# Patient Record
Sex: FEMALE | Race: WHITE | URBAN - METROPOLITAN AREA
[De-identification: names, ages, dates, MRNs, and addresses within clinical notes are randomized per-mention and may not be internally consistent; named-entity substitution may affect disease eponyms.]

---

## 2018-01-03 ENCOUNTER — HOSPITAL ENCOUNTER (OUTPATIENT)
Dept: LAB | Age: 53
Discharge: HOME | End: 2018-01-03

## 2018-01-03 LAB — SPECIMEN SENT TO LAB,INSX: NORMAL

## 2022-07-21 ENCOUNTER — APPOINTMENT (OUTPATIENT)
Dept: GENERAL RADIOLOGY | Age: 57
DRG: 482 | End: 2022-07-21
Attending: EMERGENCY MEDICINE
Payer: COMMERCIAL

## 2022-07-21 PROCEDURE — 85025 COMPLETE CBC W/AUTO DIFF WBC: CPT

## 2022-07-21 PROCEDURE — 80053 COMPREHEN METABOLIC PANEL: CPT

## 2022-07-21 PROCEDURE — 94760 N-INVAS EAR/PLS OXIMETRY 1: CPT

## 2022-07-21 PROCEDURE — 99285 EMERGENCY DEPT VISIT HI MDM: CPT

## 2022-07-21 PROCEDURE — 73502 X-RAY EXAM HIP UNI 2-3 VIEWS: CPT

## 2022-07-22 ENCOUNTER — APPOINTMENT (OUTPATIENT)
Dept: GENERAL RADIOLOGY | Age: 57
DRG: 482 | End: 2022-07-22
Attending: ORTHOPAEDIC SURGERY
Payer: COMMERCIAL

## 2022-07-22 ENCOUNTER — ANESTHESIA EVENT (OUTPATIENT)
Dept: SURGERY | Age: 57
DRG: 482 | End: 2022-07-22
Payer: COMMERCIAL

## 2022-07-22 ENCOUNTER — HOSPITAL ENCOUNTER (INPATIENT)
Age: 57
LOS: 4 days | Discharge: NURSING, REHAB OR LONG-TERM CARE | DRG: 482 | End: 2022-07-26
Attending: EMERGENCY MEDICINE | Admitting: FAMILY MEDICINE
Payer: COMMERCIAL

## 2022-07-22 ENCOUNTER — HOME HEALTH ADMISSION (OUTPATIENT)
Dept: HOME HEALTH SERVICES | Facility: HOME HEALTH | Age: 57
End: 2022-07-22

## 2022-07-22 ENCOUNTER — ANESTHESIA (OUTPATIENT)
Dept: SURGERY | Age: 57
DRG: 482 | End: 2022-07-22
Payer: COMMERCIAL

## 2022-07-22 ENCOUNTER — APPOINTMENT (OUTPATIENT)
Dept: GENERAL RADIOLOGY | Age: 57
DRG: 482 | End: 2022-07-22
Attending: EMERGENCY MEDICINE
Payer: COMMERCIAL

## 2022-07-22 DIAGNOSIS — S72.002A CLOSED FRACTURE OF NECK OF LEFT FEMUR, INITIAL ENCOUNTER (HCC): Primary | ICD-10-CM

## 2022-07-22 PROBLEM — G35 MULTIPLE SCLEROSIS (HCC): Status: ACTIVE | Noted: 2022-07-22
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
## 2022-07-22 PROBLEM — M25.552 ACUTE HIP PAIN, LEFT: Status: ACTIVE | Noted: 2022-07-22

## 2022-07-22 LAB
ABO + RH BLD: NORMAL
ALBUMIN SERPL-MCNC: 3.9 G/DL (ref 3.5–5)
ALBUMIN/GLOB SERPL: 1.2 {RATIO} (ref 1.1–2.2)
ALP SERPL-CCNC: 64 U/L (ref 45–117)
ALT SERPL-CCNC: 22 U/L (ref 12–78)
ANION GAP SERPL CALC-SCNC: 4 MMOL/L (ref 5–15)
APTT PPP: 27.2 SEC (ref 22.1–31)
AST SERPL-CCNC: 13 U/L (ref 15–37)
ATRIAL RATE: 80 BPM
BASOPHILS # BLD: 0 K/UL (ref 0–0.1)
BASOPHILS NFR BLD: 0 % (ref 0–1)
BILIRUB SERPL-MCNC: 0.2 MG/DL (ref 0.2–1)
BLOOD GROUP ANTIBODIES SERPL: NORMAL
BUN SERPL-MCNC: 14 MG/DL (ref 6–20)
BUN/CREAT SERPL: 19 (ref 12–20)
CALCIUM SERPL-MCNC: 9.3 MG/DL (ref 8.5–10.1)
CALCULATED P AXIS, ECG09: 71 DEGREES
CALCULATED R AXIS, ECG10: 54 DEGREES
CALCULATED T AXIS, ECG11: 37 DEGREES
CHLORIDE SERPL-SCNC: 107 MMOL/L (ref 97–108)
CO2 SERPL-SCNC: 30 MMOL/L (ref 21–32)
COMMENT, HOLDF: NORMAL
COVID-19 RAPID TEST, COVR: NOT DETECTED
CREAT SERPL-MCNC: 0.72 MG/DL (ref 0.55–1.02)
DIAGNOSIS, 93000: NORMAL
DIFFERENTIAL METHOD BLD: ABNORMAL
EOSINOPHIL # BLD: 0.1 K/UL (ref 0–0.4)
EOSINOPHIL NFR BLD: 1 % (ref 0–7)
ERYTHROCYTE [DISTWIDTH] IN BLOOD BY AUTOMATED COUNT: 13.3 % (ref 11.5–14.5)
GLOBULIN SER CALC-MCNC: 3.3 G/DL (ref 2–4)
GLUCOSE SERPL-MCNC: 99 MG/DL (ref 65–100)
HCT VFR BLD AUTO: 37.7 % (ref 35–47)
HGB BLD-MCNC: 12.6 G/DL (ref 11.5–16)
IMM GRANULOCYTES # BLD AUTO: 0 K/UL (ref 0–0.04)
IMM GRANULOCYTES NFR BLD AUTO: 1 % (ref 0–0.5)
INR PPP: 1 (ref 0.9–1.1)
LYMPHOCYTES # BLD: 1.4 K/UL (ref 0.8–3.5)
LYMPHOCYTES NFR BLD: 18 % (ref 12–49)
MCH RBC QN AUTO: 31.3 PG (ref 26–34)
MCHC RBC AUTO-ENTMCNC: 33.4 G/DL (ref 30–36.5)
MCV RBC AUTO: 93.5 FL (ref 80–99)
MONOCYTES # BLD: 0.5 K/UL (ref 0–1)
MONOCYTES NFR BLD: 7 % (ref 5–13)
NEUTS SEG # BLD: 5.6 K/UL (ref 1.8–8)
NEUTS SEG NFR BLD: 73 % (ref 32–75)
NRBC # BLD: 0 K/UL (ref 0–0.01)
NRBC BLD-RTO: 0 PER 100 WBC
P-R INTERVAL, ECG05: 116 MS
PLATELET # BLD AUTO: 234 K/UL (ref 150–400)
PMV BLD AUTO: 10.4 FL (ref 8.9–12.9)
POTASSIUM SERPL-SCNC: 3.9 MMOL/L (ref 3.5–5.1)
PROT SERPL-MCNC: 7.2 G/DL (ref 6.4–8.2)
PROTHROMBIN TIME: 10.8 SEC (ref 9–11.1)
Q-T INTERVAL, ECG07: 360 MS
QRS DURATION, ECG06: 82 MS
QTC CALCULATION (BEZET), ECG08: 415 MS
RBC # BLD AUTO: 4.03 M/UL (ref 3.8–5.2)
SAMPLES BEING HELD,HOLD: NORMAL
SODIUM SERPL-SCNC: 141 MMOL/L (ref 136–145)
SOURCE, COVRS: NORMAL
SPECIMEN EXP DATE BLD: NORMAL
THERAPEUTIC RANGE,PTTT: NORMAL SECS (ref 58–77)
VENTRICULAR RATE, ECG03: 80 BPM
WBC # BLD AUTO: 7.7 K/UL (ref 3.6–11)

## 2022-07-22 PROCEDURE — 0QH734Z INSERTION OF INTERNAL FIXATION DEVICE INTO LEFT UPPER FEMUR, PERCUTANEOUS APPROACH: ICD-10-PCS | Performed by: ORTHOPAEDIC SURGERY

## 2022-07-22 PROCEDURE — 77030008684 HC TU ET CUF COVD -B: Performed by: ANESTHESIOLOGY

## 2022-07-22 PROCEDURE — 74011000250 HC RX REV CODE- 250: Performed by: NURSE ANESTHETIST, CERTIFIED REGISTERED

## 2022-07-22 PROCEDURE — 93005 ELECTROCARDIOGRAM TRACING: CPT

## 2022-07-22 PROCEDURE — 74011250636 HC RX REV CODE- 250/636: Performed by: PHYSICIAN ASSISTANT

## 2022-07-22 PROCEDURE — 74011250636 HC RX REV CODE- 250/636: Performed by: NURSE ANESTHETIST, CERTIFIED REGISTERED

## 2022-07-22 PROCEDURE — 2709999900 HC NON-CHARGEABLE SUPPLY: Performed by: ORTHOPAEDIC SURGERY

## 2022-07-22 PROCEDURE — 74011250636 HC RX REV CODE- 250/636: Performed by: ANESTHESIOLOGY

## 2022-07-22 PROCEDURE — 99222 1ST HOSP IP/OBS MODERATE 55: CPT | Performed by: ORTHOPAEDIC SURGERY

## 2022-07-22 PROCEDURE — 65270000029 HC RM PRIVATE

## 2022-07-22 PROCEDURE — 77030041279 HC DRSG PRMSL AG MDII -B: Performed by: ORTHOPAEDIC SURGERY

## 2022-07-22 PROCEDURE — 76210000016 HC OR PH I REC 1 TO 1.5 HR: Performed by: ORTHOPAEDIC SURGERY

## 2022-07-22 PROCEDURE — 77030002933 HC SUT MCRYL J&J -A: Performed by: ORTHOPAEDIC SURGERY

## 2022-07-22 PROCEDURE — 27235 TREAT THIGH FRACTURE: CPT | Performed by: ORTHOPAEDIC SURGERY

## 2022-07-22 PROCEDURE — 77030020788: Performed by: ORTHOPAEDIC SURGERY

## 2022-07-22 PROCEDURE — 77030011264 HC ELECTRD BLD EXT COVD -A: Performed by: ORTHOPAEDIC SURGERY

## 2022-07-22 PROCEDURE — 76060000034 HC ANESTHESIA 1.5 TO 2 HR: Performed by: ORTHOPAEDIC SURGERY

## 2022-07-22 PROCEDURE — 73501 X-RAY EXAM HIP UNI 1 VIEW: CPT

## 2022-07-22 PROCEDURE — 85610 PROTHROMBIN TIME: CPT

## 2022-07-22 PROCEDURE — 74011250637 HC RX REV CODE- 250/637: Performed by: ANESTHESIOLOGY

## 2022-07-22 PROCEDURE — C1769 GUIDE WIRE: HCPCS | Performed by: ORTHOPAEDIC SURGERY

## 2022-07-22 PROCEDURE — 36415 COLL VENOUS BLD VENIPUNCTURE: CPT

## 2022-07-22 PROCEDURE — 77030006802 HC BLD SAW RECIP BRSM -B: Performed by: ORTHOPAEDIC SURGERY

## 2022-07-22 PROCEDURE — 74011250637 HC RX REV CODE- 250/637: Performed by: NURSE PRACTITIONER

## 2022-07-22 PROCEDURE — 71045 X-RAY EXAM CHEST 1 VIEW: CPT

## 2022-07-22 PROCEDURE — 86900 BLOOD TYPING SEROLOGIC ABO: CPT

## 2022-07-22 PROCEDURE — 74011250637 HC RX REV CODE- 250/637: Performed by: PHYSICIAN ASSISTANT

## 2022-07-22 PROCEDURE — 74011000250 HC RX REV CODE- 250: Performed by: ORTHOPAEDIC SURGERY

## 2022-07-22 PROCEDURE — 74011000250 HC RX REV CODE- 250: Performed by: PHYSICIAN ASSISTANT

## 2022-07-22 PROCEDURE — 77030035236 HC SUT PDS STRATFX BARB J&J -B: Performed by: ORTHOPAEDIC SURGERY

## 2022-07-22 PROCEDURE — C1713 ANCHOR/SCREW BN/BN,TIS/BN: HCPCS | Performed by: ORTHOPAEDIC SURGERY

## 2022-07-22 PROCEDURE — 74011000250 HC RX REV CODE- 250: Performed by: FAMILY MEDICINE

## 2022-07-22 PROCEDURE — 85730 THROMBOPLASTIN TIME PARTIAL: CPT

## 2022-07-22 PROCEDURE — 77030006822 HC BLD SAW SAG BRSM -B: Performed by: ORTHOPAEDIC SURGERY

## 2022-07-22 PROCEDURE — 77030018547 HC SUT ETHBND1 J&J -B: Performed by: ORTHOPAEDIC SURGERY

## 2022-07-22 PROCEDURE — 73552 X-RAY EXAM OF FEMUR 2/>: CPT

## 2022-07-22 PROCEDURE — 76010000161 HC OR TIME 1 TO 1.5 HR INTENSV-TIER 1: Performed by: ORTHOPAEDIC SURGERY

## 2022-07-22 PROCEDURE — 77030031139 HC SUT VCRL2 J&J -A: Performed by: ORTHOPAEDIC SURGERY

## 2022-07-22 PROCEDURE — 87635 SARS-COV-2 COVID-19 AMP PRB: CPT

## 2022-07-22 PROCEDURE — 77030026438 HC STYL ET INTUB CARD -A: Performed by: ANESTHESIOLOGY

## 2022-07-22 PROCEDURE — 94761 N-INVAS EAR/PLS OXIMETRY MLT: CPT

## 2022-07-22 PROCEDURE — 77030010507 HC ADH SKN DERMBND J&J -B: Performed by: ORTHOPAEDIC SURGERY

## 2022-07-22 DEVICE — SCREW BNE L80MM DIA6.5MM THRD L16MM CANC S STL SELF DRL ST: Type: IMPLANTABLE DEVICE | Site: HIP | Status: FUNCTIONAL

## 2022-07-22 RX ORDER — SODIUM CHLORIDE, SODIUM LACTATE, POTASSIUM CHLORIDE, CALCIUM CHLORIDE 600; 310; 30; 20 MG/100ML; MG/100ML; MG/100ML; MG/100ML
1000 INJECTION, SOLUTION INTRAVENOUS CONTINUOUS
Status: DISCONTINUED | OUTPATIENT
Start: 2022-07-22 | End: 2022-07-23

## 2022-07-22 RX ORDER — FENTANYL CITRATE 50 UG/ML
50 INJECTION, SOLUTION INTRAMUSCULAR; INTRAVENOUS AS NEEDED
Status: DISCONTINUED | OUTPATIENT
Start: 2022-07-22 | End: 2022-07-23

## 2022-07-22 RX ORDER — PHENYLEPHRINE HCL IN 0.9% NACL 0.4MG/10ML
SYRINGE (ML) INTRAVENOUS AS NEEDED
Status: DISCONTINUED | OUTPATIENT
Start: 2022-07-22 | End: 2022-07-22 | Stop reason: HOSPADM

## 2022-07-22 RX ORDER — FENTANYL CITRATE 50 UG/ML
INJECTION, SOLUTION INTRAMUSCULAR; INTRAVENOUS AS NEEDED
Status: DISCONTINUED | OUTPATIENT
Start: 2022-07-22 | End: 2022-07-22 | Stop reason: HOSPADM

## 2022-07-22 RX ORDER — SODIUM CHLORIDE, SODIUM LACTATE, POTASSIUM CHLORIDE, CALCIUM CHLORIDE 600; 310; 30; 20 MG/100ML; MG/100ML; MG/100ML; MG/100ML
1000 INJECTION, SOLUTION INTRAVENOUS CONTINUOUS
Status: DISCONTINUED | OUTPATIENT
Start: 2022-07-22 | End: 2022-07-22 | Stop reason: HOSPADM

## 2022-07-22 RX ORDER — POLYETHYLENE GLYCOL 3350 17 G/17G
17 POWDER, FOR SOLUTION ORAL DAILY
Status: DISCONTINUED | OUTPATIENT
Start: 2022-07-23 | End: 2022-07-26 | Stop reason: HOSPADM

## 2022-07-22 RX ORDER — ONDANSETRON 2 MG/ML
INJECTION INTRAMUSCULAR; INTRAVENOUS AS NEEDED
Status: DISCONTINUED | OUTPATIENT
Start: 2022-07-22 | End: 2022-07-22 | Stop reason: HOSPADM

## 2022-07-22 RX ORDER — FAMOTIDINE 20 MG/1
20 TABLET, FILM COATED ORAL 2 TIMES DAILY
Status: DISCONTINUED | OUTPATIENT
Start: 2022-07-22 | End: 2022-07-26 | Stop reason: HOSPADM

## 2022-07-22 RX ORDER — AMOXICILLIN 250 MG
1 CAPSULE ORAL 2 TIMES DAILY
Status: DISCONTINUED | OUTPATIENT
Start: 2022-07-22 | End: 2022-07-26 | Stop reason: HOSPADM

## 2022-07-22 RX ORDER — ACETAMINOPHEN 650 MG/1
650 SUPPOSITORY RECTAL
Status: DISCONTINUED | OUTPATIENT
Start: 2022-07-22 | End: 2022-07-26 | Stop reason: HOSPADM

## 2022-07-22 RX ORDER — DEXAMETHASONE SODIUM PHOSPHATE 4 MG/ML
INJECTION, SOLUTION INTRA-ARTICULAR; INTRALESIONAL; INTRAMUSCULAR; INTRAVENOUS; SOFT TISSUE AS NEEDED
Status: DISCONTINUED | OUTPATIENT
Start: 2022-07-22 | End: 2022-07-22 | Stop reason: HOSPADM

## 2022-07-22 RX ORDER — FACIAL-BODY WIPES
10 EACH TOPICAL DAILY PRN
Status: DISCONTINUED | OUTPATIENT
Start: 2022-07-24 | End: 2022-07-26 | Stop reason: HOSPADM

## 2022-07-22 RX ORDER — HYDROCODONE BITARTRATE AND ACETAMINOPHEN 5; 325 MG/1; MG/1
1 TABLET ORAL AS NEEDED
Status: DISCONTINUED | OUTPATIENT
Start: 2022-07-22 | End: 2022-07-22 | Stop reason: HOSPADM

## 2022-07-22 RX ORDER — HYDROXYZINE HYDROCHLORIDE 10 MG/1
10 TABLET, FILM COATED ORAL
Status: DISCONTINUED | OUTPATIENT
Start: 2022-07-22 | End: 2022-07-26 | Stop reason: HOSPADM

## 2022-07-22 RX ORDER — LIDOCAINE 4 G/100G
1 PATCH TOPICAL EVERY 24 HOURS
Status: DISCONTINUED | OUTPATIENT
Start: 2022-07-22 | End: 2022-07-26 | Stop reason: HOSPADM

## 2022-07-22 RX ORDER — GLYCOPYRROLATE 0.2 MG/ML
0.2 INJECTION INTRAMUSCULAR; INTRAVENOUS
Status: DISCONTINUED | OUTPATIENT
Start: 2022-07-22 | End: 2022-07-23

## 2022-07-22 RX ORDER — OXYCODONE HYDROCHLORIDE 5 MG/1
5 TABLET ORAL
Status: DISCONTINUED | OUTPATIENT
Start: 2022-07-22 | End: 2022-07-26 | Stop reason: HOSPADM

## 2022-07-22 RX ORDER — MIDAZOLAM HYDROCHLORIDE 1 MG/ML
INJECTION, SOLUTION INTRAMUSCULAR; INTRAVENOUS AS NEEDED
Status: DISCONTINUED | OUTPATIENT
Start: 2022-07-22 | End: 2022-07-22 | Stop reason: HOSPADM

## 2022-07-22 RX ORDER — LIDOCAINE HYDROCHLORIDE 10 MG/ML
0.1 INJECTION, SOLUTION EPIDURAL; INFILTRATION; INTRACAUDAL; PERINEURAL AS NEEDED
Status: DISCONTINUED | OUTPATIENT
Start: 2022-07-22 | End: 2022-07-23

## 2022-07-22 RX ORDER — TRAMADOL HYDROCHLORIDE 50 MG/1
50 TABLET ORAL
Status: DISCONTINUED | OUTPATIENT
Start: 2022-07-22 | End: 2022-07-26 | Stop reason: HOSPADM

## 2022-07-22 RX ORDER — MORPHINE SULFATE 2 MG/ML
2 INJECTION, SOLUTION INTRAMUSCULAR; INTRAVENOUS
Status: DISCONTINUED | OUTPATIENT
Start: 2022-07-22 | End: 2022-07-22 | Stop reason: HOSPADM

## 2022-07-22 RX ORDER — ONDANSETRON 2 MG/ML
4 INJECTION INTRAMUSCULAR; INTRAVENOUS
Status: DISCONTINUED | OUTPATIENT
Start: 2022-07-22 | End: 2022-07-26 | Stop reason: HOSPADM

## 2022-07-22 RX ORDER — ONDANSETRON 4 MG/1
4 TABLET, ORALLY DISINTEGRATING ORAL
Status: DISCONTINUED | OUTPATIENT
Start: 2022-07-22 | End: 2022-07-26 | Stop reason: HOSPADM

## 2022-07-22 RX ORDER — HYDROMORPHONE HYDROCHLORIDE 1 MG/ML
0.5 INJECTION, SOLUTION INTRAMUSCULAR; INTRAVENOUS; SUBCUTANEOUS
Status: DISCONTINUED | OUTPATIENT
Start: 2022-07-22 | End: 2022-07-26 | Stop reason: HOSPADM

## 2022-07-22 RX ORDER — ROCURONIUM BROMIDE 10 MG/ML
INJECTION, SOLUTION INTRAVENOUS AS NEEDED
Status: DISCONTINUED | OUTPATIENT
Start: 2022-07-22 | End: 2022-07-22 | Stop reason: HOSPADM

## 2022-07-22 RX ORDER — DEXMEDETOMIDINE HYDROCHLORIDE 100 UG/ML
INJECTION, SOLUTION INTRAVENOUS AS NEEDED
Status: DISCONTINUED | OUTPATIENT
Start: 2022-07-22 | End: 2022-07-22 | Stop reason: HOSPADM

## 2022-07-22 RX ORDER — POLYETHYLENE GLYCOL 3350 17 G/17G
17 POWDER, FOR SOLUTION ORAL DAILY PRN
Status: DISCONTINUED | OUTPATIENT
Start: 2022-07-22 | End: 2022-07-26 | Stop reason: HOSPADM

## 2022-07-22 RX ORDER — SODIUM CHLORIDE 9 MG/ML
25 INJECTION, SOLUTION INTRAVENOUS CONTINUOUS
Status: DISCONTINUED | OUTPATIENT
Start: 2022-07-22 | End: 2022-07-22 | Stop reason: HOSPADM

## 2022-07-22 RX ORDER — ASPIRIN 81 MG/1
81 TABLET ORAL 2 TIMES DAILY
Status: DISCONTINUED | OUTPATIENT
Start: 2022-07-22 | End: 2022-07-26 | Stop reason: HOSPADM

## 2022-07-22 RX ORDER — BACLOFEN 10 MG/1
10 TABLET ORAL 4 TIMES DAILY
COMMUNITY

## 2022-07-22 RX ORDER — SODIUM CHLORIDE 0.9 % (FLUSH) 0.9 %
5-40 SYRINGE (ML) INJECTION EVERY 8 HOURS
Status: DISCONTINUED | OUTPATIENT
Start: 2022-07-22 | End: 2022-07-26 | Stop reason: HOSPADM

## 2022-07-22 RX ORDER — ONDANSETRON 2 MG/ML
4 INJECTION INTRAMUSCULAR; INTRAVENOUS AS NEEDED
Status: DISCONTINUED | OUTPATIENT
Start: 2022-07-22 | End: 2022-07-22 | Stop reason: HOSPADM

## 2022-07-22 RX ORDER — SODIUM CHLORIDE 9 MG/ML
25 INJECTION, SOLUTION INTRAVENOUS CONTINUOUS
Status: DISCONTINUED | OUTPATIENT
Start: 2022-07-22 | End: 2022-07-23

## 2022-07-22 RX ORDER — HYDROMORPHONE HYDROCHLORIDE 1 MG/ML
0.2 INJECTION, SOLUTION INTRAMUSCULAR; INTRAVENOUS; SUBCUTANEOUS
Status: DISCONTINUED | OUTPATIENT
Start: 2022-07-22 | End: 2022-07-22 | Stop reason: HOSPADM

## 2022-07-22 RX ORDER — ROPIVACAINE HYDROCHLORIDE 5 MG/ML
30 INJECTION, SOLUTION EPIDURAL; INFILTRATION; PERINEURAL AS NEEDED
Status: DISCONTINUED | OUTPATIENT
Start: 2022-07-22 | End: 2022-07-23

## 2022-07-22 RX ORDER — FLUOXETINE 10 MG/1
10 CAPSULE ORAL DAILY
Status: DISCONTINUED | OUTPATIENT
Start: 2022-07-22 | End: 2022-07-26 | Stop reason: HOSPADM

## 2022-07-22 RX ORDER — ACETAMINOPHEN 325 MG/1
650 TABLET ORAL ONCE
Status: COMPLETED | OUTPATIENT
Start: 2022-07-22 | End: 2022-07-22

## 2022-07-22 RX ORDER — PROPOFOL 10 MG/ML
INJECTION, EMULSION INTRAVENOUS AS NEEDED
Status: DISCONTINUED | OUTPATIENT
Start: 2022-07-22 | End: 2022-07-22 | Stop reason: HOSPADM

## 2022-07-22 RX ORDER — FLUOXETINE 10 MG/1
10 CAPSULE ORAL DAILY
COMMUNITY

## 2022-07-22 RX ORDER — FENTANYL CITRATE 50 UG/ML
25 INJECTION, SOLUTION INTRAMUSCULAR; INTRAVENOUS
Status: DISCONTINUED | OUTPATIENT
Start: 2022-07-22 | End: 2022-07-22 | Stop reason: HOSPADM

## 2022-07-22 RX ORDER — SODIUM CHLORIDE 0.9 % (FLUSH) 0.9 %
5-40 SYRINGE (ML) INJECTION AS NEEDED
Status: DISCONTINUED | OUTPATIENT
Start: 2022-07-22 | End: 2022-07-26 | Stop reason: HOSPADM

## 2022-07-22 RX ORDER — DIPHENHYDRAMINE HYDROCHLORIDE 50 MG/ML
12.5 INJECTION, SOLUTION INTRAMUSCULAR; INTRAVENOUS AS NEEDED
Status: DISCONTINUED | OUTPATIENT
Start: 2022-07-22 | End: 2022-07-22 | Stop reason: HOSPADM

## 2022-07-22 RX ORDER — HYDROMORPHONE HYDROCHLORIDE 2 MG/ML
INJECTION, SOLUTION INTRAMUSCULAR; INTRAVENOUS; SUBCUTANEOUS AS NEEDED
Status: DISCONTINUED | OUTPATIENT
Start: 2022-07-22 | End: 2022-07-22 | Stop reason: HOSPADM

## 2022-07-22 RX ORDER — MIDAZOLAM HYDROCHLORIDE 1 MG/ML
1 INJECTION, SOLUTION INTRAMUSCULAR; INTRAVENOUS AS NEEDED
Status: DISCONTINUED | OUTPATIENT
Start: 2022-07-22 | End: 2022-07-23

## 2022-07-22 RX ORDER — LIDOCAINE HYDROCHLORIDE 20 MG/ML
INJECTION, SOLUTION EPIDURAL; INFILTRATION; INTRACAUDAL; PERINEURAL AS NEEDED
Status: DISCONTINUED | OUTPATIENT
Start: 2022-07-22 | End: 2022-07-22 | Stop reason: HOSPADM

## 2022-07-22 RX ORDER — BACLOFEN 10 MG/1
10 TABLET ORAL 4 TIMES DAILY
Status: DISCONTINUED | OUTPATIENT
Start: 2022-07-22 | End: 2022-07-26 | Stop reason: HOSPADM

## 2022-07-22 RX ORDER — ACETAMINOPHEN 325 MG/1
650 TABLET ORAL
Status: DISCONTINUED | OUTPATIENT
Start: 2022-07-22 | End: 2022-07-26 | Stop reason: HOSPADM

## 2022-07-22 RX ORDER — MIDAZOLAM HYDROCHLORIDE 1 MG/ML
0.5 INJECTION, SOLUTION INTRAMUSCULAR; INTRAVENOUS
Status: DISCONTINUED | OUTPATIENT
Start: 2022-07-22 | End: 2022-07-22 | Stop reason: HOSPADM

## 2022-07-22 RX ORDER — ALBUTEROL SULFATE 0.83 MG/ML
2.5 SOLUTION RESPIRATORY (INHALATION) AS NEEDED
Status: DISCONTINUED | OUTPATIENT
Start: 2022-07-22 | End: 2022-07-22 | Stop reason: HOSPADM

## 2022-07-22 RX ORDER — SUCCINYLCHOLINE CHLORIDE 20 MG/ML
INJECTION INTRAMUSCULAR; INTRAVENOUS AS NEEDED
Status: DISCONTINUED | OUTPATIENT
Start: 2022-07-22 | End: 2022-07-22 | Stop reason: HOSPADM

## 2022-07-22 RX ORDER — MORPHINE SULFATE 2 MG/ML
1 INJECTION, SOLUTION INTRAMUSCULAR; INTRAVENOUS
Status: DISCONTINUED | OUTPATIENT
Start: 2022-07-22 | End: 2022-07-24

## 2022-07-22 RX ORDER — BUPIVACAINE HYDROCHLORIDE AND EPINEPHRINE 5; 5 MG/ML; UG/ML
INJECTION, SOLUTION EPIDURAL; INTRACAUDAL; PERINEURAL AS NEEDED
Status: DISCONTINUED | OUTPATIENT
Start: 2022-07-22 | End: 2022-07-22 | Stop reason: HOSPADM

## 2022-07-22 RX ADMIN — PROPOFOL 120 MG: 10 INJECTION, EMULSION INTRAVENOUS at 15:24

## 2022-07-22 RX ADMIN — ROCURONIUM BROMIDE 20 MG: 10 SOLUTION INTRAVENOUS at 15:35

## 2022-07-22 RX ADMIN — DEXMEDETOMIDINE HYDROCHLORIDE 10 MCG: 100 INJECTION, SOLUTION, CONCENTRATE INTRAVENOUS at 15:19

## 2022-07-22 RX ADMIN — SODIUM CHLORIDE, PRESERVATIVE FREE 10 ML: 5 INJECTION INTRAVENOUS at 07:00

## 2022-07-22 RX ADMIN — CEFAZOLIN 2 G: 1 INJECTION, POWDER, FOR SOLUTION INTRAMUSCULAR; INTRAVENOUS at 23:55

## 2022-07-22 RX ADMIN — FENTANYL CITRATE 50 MCG: 50 INJECTION, SOLUTION INTRAMUSCULAR; INTRAVENOUS at 15:24

## 2022-07-22 RX ADMIN — WATER 2 G: 1 INJECTION INTRAMUSCULAR; INTRAVENOUS; SUBCUTANEOUS at 15:40

## 2022-07-22 RX ADMIN — ROCURONIUM BROMIDE 10 MG: 10 SOLUTION INTRAVENOUS at 15:24

## 2022-07-22 RX ADMIN — DOCUSATE SODIUM 50MG AND SENNOSIDES 8.6MG 1 TABLET: 8.6; 5 TABLET, FILM COATED ORAL at 19:14

## 2022-07-22 RX ADMIN — BACLOFEN 10 MG: 10 TABLET ORAL at 10:31

## 2022-07-22 RX ADMIN — MIDAZOLAM 2 MG: 1 INJECTION INTRAMUSCULAR; INTRAVENOUS at 15:13

## 2022-07-22 RX ADMIN — Medication 80 MG: at 15:25

## 2022-07-22 RX ADMIN — FAMOTIDINE 20 MG: 20 TABLET ORAL at 19:14

## 2022-07-22 RX ADMIN — ACETAMINOPHEN 650 MG: 325 TABLET ORAL at 14:53

## 2022-07-22 RX ADMIN — BACLOFEN 10 MG: 10 TABLET ORAL at 22:08

## 2022-07-22 RX ADMIN — HYDROMORPHONE HYDROCHLORIDE 1 MG: 2 INJECTION, SOLUTION INTRAMUSCULAR; INTRAVENOUS; SUBCUTANEOUS at 15:38

## 2022-07-22 RX ADMIN — LIDOCAINE HYDROCHLORIDE 40 MG: 20 INJECTION, SOLUTION EPIDURAL; INFILTRATION; INTRACAUDAL; PERINEURAL at 15:24

## 2022-07-22 RX ADMIN — SODIUM CHLORIDE, POTASSIUM CHLORIDE, SODIUM LACTATE AND CALCIUM CHLORIDE: 600; 310; 30; 20 INJECTION, SOLUTION INTRAVENOUS at 15:00

## 2022-07-22 RX ADMIN — PHENYLEPHRINE HYDROCHLORIDE 30 MCG/MIN: 10 INJECTION INTRAVENOUS at 15:41

## 2022-07-22 RX ADMIN — BACLOFEN 10 MG: 10 TABLET ORAL at 13:00

## 2022-07-22 RX ADMIN — DEXAMETHASONE SODIUM PHOSPHATE 4 MG: 4 INJECTION, SOLUTION INTRAMUSCULAR; INTRAVENOUS at 15:36

## 2022-07-22 RX ADMIN — ASPIRIN 81 MG: 81 TABLET, COATED ORAL at 19:14

## 2022-07-22 RX ADMIN — FENTANYL CITRATE 50 MCG: 50 INJECTION, SOLUTION INTRAMUSCULAR; INTRAVENOUS at 16:12

## 2022-07-22 RX ADMIN — ONDANSETRON HYDROCHLORIDE 4 MG: 2 INJECTION, SOLUTION INTRAMUSCULAR; INTRAVENOUS at 15:36

## 2022-07-22 RX ADMIN — SUGAMMADEX 150 MG: 100 INJECTION, SOLUTION INTRAVENOUS at 16:21

## 2022-07-22 RX ADMIN — Medication 80 MCG: at 15:41

## 2022-07-22 NOTE — ANESTHESIA PREPROCEDURE EVALUATION
Relevant Problems   No relevant active problems       Anesthetic History   No history of anesthetic complications            Review of Systems / Medical History  Patient summary reviewed, nursing notes reviewed and pertinent labs reviewed    Pulmonary  Within defined limits                 Neuro/Psych   Within defined limits          Comments: Multiple sclerosis Cardiovascular  Within defined limits                     GI/Hepatic/Renal  Within defined limits              Endo/Other  Within defined limits           Other Findings              Physical Exam    Airway  Mallampati: II  TM Distance: > 6 cm  Neck ROM: normal range of motion   Mouth opening: Normal     Cardiovascular  Regular rate and rhythm,  S1 and S2 normal,  no murmur, click, rub, or gallop             Dental  No notable dental hx       Pulmonary  Breath sounds clear to auscultation               Abdominal  GI exam deferred       Other Findings            Anesthetic Plan    ASA: 2  Anesthesia type: general          Induction: Intravenous  Anesthetic plan and risks discussed with: Patient

## 2022-07-22 NOTE — PROGRESS NOTES
[address] [facility] Adult  Hospitalist Group                                                                                          Hospitalist Progress Note  Teresa Morales NP  Answering service: [phone] from in house phone        Date of Service:  2022  NAME:  Nolberto Doll  :  1965  MRN:  [id number]      Admission Summary:   Per H&P, Nolberto Doll is a 61 y.o. female with past medical history of multiple sclerosis presented to the ED with chief complaint of left hip pain after fall. Patient reportedly had a ground level fall, after tripping and landing on hard tile floor in her bedroom at about 10 p.m. tonight. She complains of left hip pain, sudden after fall, moderate to severe, aggravated with movement, weight bearing, or touch. She denies any head/ neck trauma or loss of consciousness. She notes having chronic left foot drop with left sided weakness secondary to MS. She notes that she takes Baclofen for the same. On arrival in the ED, workup included left femur xray showing acute left femoral neck fractures. ED then requested admission to the hospitalist service. Per report from ED MD, orthopedic surgery service are aware of consultation       Interval history / Subjective:   I saw the patient today on rounds. Denies fever, chills, urinary symptoms. Pain is currently controlled lying still in bed. Assessment & Plan:        Left femoral neck fracture  S/p ground-level fall  Orthopedics following, appreciate recommendations  To the OR today  Continuing multimodal pain control    According to the revised cardiac risk index, no history of ischemic heart disease, no history of congestive heart failure, no history of CVA. She is not diabetic and normal creatinine.   Class I risk    MS  I reviewed medications with the patient  Restarting her baclofen    Bradycardia  No further bradycardia noted       Code status: I readdressed this with the patient, patient is DNR. Order corrected in medical record  Prophylaxis: SCD, will order LMWH cleared by orthopedics  Care Plan discussed with: Patient, family, nurse, care manager, consultant/orthopedics  Anticipated Disposition: I will have physical therapy evaluate after surgery to determine possible needs     Hospital Problems  Date Reviewed: 6/2/2017            Codes Class Noted POA    Multiple sclerosis ([facility] [address] ICD-10-CM: Amber Jacobo  ICD-9-CM: 340  7/22/2022 Unknown        Closed fracture of neck of left femur ([facility] [address] ICD-10-CM: Z29.662G  ICD-9-CM: 820.8  7/22/2022 Unknown        Acute hip pain, left ICD-10-CM: M25.552  ICD-9-CM: 719.45  7/22/2022 Unknown        Fall from ground level ICD-10-CM: C09.45QY  ICD-9-CM: E888.9  7/22/2022 Unknown             Review of Systems:   A comprehensive review of systems was negative except for that written in the HPI. Vital Signs:    Last 24hrs VS reviewed since prior progress note. Most recent are:  Visit Vitals  /77 (BP 1 Location: Left upper arm, BP Patient Position: At rest)   Pulse 85   Temp 97.9 °F (36.6 °C)   Resp 18   SpO2 97%       No intake or output data in the 24 hours ending 07/22/22 1347     Physical Examination:     I had a face to face encounter with this patient and independently examined them on 7/22/2022 as outlined below:          Constitutional:  No acute distress, cooperative, pleasant    ENT:  Oral mucosa moist, oropharynx benign. Resp:  CTA bilaterally. No wheezing/rhonchi/rales. No accessory muscle use. CV:  Regular rhythm, normal rate, no murmurs, gallops, rubs    GI:  Soft, non distended, non tender. normoactive bowel sounds, no hepatosplenomegaly     Musculoskeletal:  No edema, warm, 2+ pulses throughout    Neurologic:  Moves all extremities.   AAOx3, CN II-XII reviewed            Data Review:    Review and/or order of clinical lab test  Review and/or order of tests in the radiology section of CPT  I personally reviewed  Image      Labs:     Recent Labs     07/21/22  2352   WBC 7.7   HGB 12.6   HCT 37.7        Recent Labs     07/21/22  2352      K 3.9      CO2 30   BUN 14   CREA 0.72   GLU 99   CA 9.3     Recent Labs     07/21/22  2352   ALT 22   AP 64   TBILI 0.2   TP 7.2   ALB 3.9   GLOB 3.3     No results for input(s): INR, PTP, APTT, INREXT in the last 72 hours. No results for input(s): FE, TIBC, PSAT, FERR in the last 72 hours. No results found for: FOL, RBCF   No results for input(s): PH, PCO2, PO2 in the last 72 hours. No results for input(s): CPK, CKNDX, TROIQ in the last 72 hours.     No lab exists for component: CPKMB  No results found for: CHOL, CHOLX, CHLST, CHOLV, HDL, HDLP, LDL, LDLC, DLDLP, TGLX, TRIGL, TRIGP, CHHD, CHHDX  No results found for: GLUCPOC  No results found for: COLOR, APPRN, SPGRU, REFSG, ALLIE, PROTU, GLUCU, KETU, BILU, UROU, MEJIA, LEUKU, GLUKE, EPSU, BACTU, WBCU, RBCU, CASTS, UCRY      Medications Reviewed:     Current Facility-Administered Medications   Medication Dose Route Frequency    sodium chloride (NS) flush 5-40 mL  5-40 mL IntraVENous Q8H    sodium chloride (NS) flush 5-40 mL  5-40 mL IntraVENous PRN    acetaminophen (TYLENOL) tablet 650 mg  650 mg Oral Q6H PRN    Or    acetaminophen (TYLENOL) suppository 650 mg  650 mg Rectal Q6H PRN    polyethylene glycol (MIRALAX) packet 17 g  17 g Oral DAILY PRN    ondansetron (ZOFRAN ODT) tablet 4 mg  4 mg Oral Q8H PRN    Or    ondansetron (ZOFRAN) injection 4 mg  4 mg IntraVENous Q6H PRN    morphine injection 1 mg  1 mg IntraVENous Q4H PRN    baclofen (LIORESAL) tablet 10 mg  10 mg Oral QID    FLUoxetine (PROzac) capsule 10 mg  10 mg Oral DAILY    HYDROmorphone (DILAUDID) injection 0.5 mg  0.5 mg IntraVENous Q4H PRN    lidocaine 4 % patch 1 Patch  1 Patch TransDERmal Q24H    oxyCODONE IR (ROXICODONE) tablet 5 mg  5 mg Oral Q4H PRN    traMADoL (ULTRAM) tablet 50 mg  50 mg Oral Q6H PRN ______________________________________________________________________  EXPECTED LENGTH OF STAY: - - -  ACTUAL LENGTH OF STAY:          0                 Tamia Regan NP

## 2022-07-22 NOTE — ED TRIAGE NOTES
Triage: Pt arrives from home with CC of left hip pain following a fall. She has been unable to bare weight on her LLE since the fall. She reports a hx of MS which is what caused her to fall.

## 2022-07-22 NOTE — H&P
[facility] ADULT HOSPITALIST    History & Physical      Date of admission: 7/22/2022    Patient name: Dariana Isaac  MRN: [id number]  YOB: 1965  Age: 61 y.o. Primary care provider:  Colleen Kirkland MD     Source of Information: patient, ED and electronic medical records                              Chief complaint: left hip pain after fall    History of present illness  Dariana Isaac is a 61 y.o. female with past medical history of multiple sclerosis presented to the ED with chief complaint of left hip pain after fall. Patient reportedly had a ground level fall, after tripping and landing on hard tile floor in her bedroom at about 10 p.m. tonight. She complains of left hip pain, sudden after fall, moderate to severe, aggravated with movement, weight bearing, or touch. She denies any head/ neck trauma or loss of consciousness. She notes having chronic left foot drop with left sided weakness secondary to MS. She notes that she takes Baclofen for the same. On arrival in the ED, workup included left femur xray showing acute left femoral neck fractures. ED then requested admission to the hospitalist service. Per report from ED MD, orthopedic surgery service are aware of consultation. 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 Authorizing Provider   nirmatrelvir-ritonavir (PAXLOVID) 150 mg x 2- 100 mg tablet As dir 7/5/22   Colleen Kirkland MD   fluocinoNIDE (LIDEX) 0.05 % topical cream Apply  to affected area two (2) times a day. 5/16/17   Colleen Kirkland MD   glatiramer (COPAXONE) 20 mg injection 20 mg by SubCUTAneous route daily.     Provider, Historical     Allergies   Allergen Reactions    Sulfa (Sulfonamide Antibiotics) Unknown (comments)         Social history  Patient resides  X  Independently    X             Ambulates  x  Independently                 Alcohol history   x  occasional           Smoking history  x  denies             Illicit drugs:  denies    Family History   Problem Relation Age of Onset    Cancer Mother         colon-age 37    Thyroid Disease Mother     Hypertension Father     Thyroid Disease Sister     OSTEOARTHRITIS Brother           Review of systems  Pertinent positives as noted in HPI. All other systems were reviewed and were negative     Physical Examination   Visit Vitals  /68 (BP 1 Location: Left upper arm, BP Patient Position: At rest)   Pulse (!) 56   Temp 98.1 °F (36.7 °C)   Resp 16   SpO2 100%          O2 Device: None (Room air)    General:  Patient in no acute respiratory distress. Head:  Normocephalic, without obvious abnormality, atraumatic   Eyes:  Conjunctivae/corneas clear. PERRLA EOMs intact   E/N/M/T: Nares normal. Septum midline.  No nasal drainage or sinus tenderness  Tongue midline/ non-edematous  Clear oropharynx   Neck: Normal appearance and movements, symmetrical, trachea midline  No palpable adenopathy  No thyroid enlargement, tenderness or nodules  No carotid bruit   No JVD  Trachea midline   Lungs:   Symmetrical chest expansion and respiratory effort  Clear to auscultation bilaterally   Chest wall:  No tenderness or deformity   Heart:  Regular rate and rhythm   Normal S1 and S2; no murmur, click, rub or gallop   Abdomen:   Soft, no tenderness  No rebound, guarding, or rigidity  Non-distended   Bowel sounds normal  No masses or hepatosplenomegaly  No hernias present   Back: No costovertebral angle tenderness  No step-off deformity   Extremities: Tenderness of left hip   Limited range of motion  No cyanosis or edema     Vascular/  Pulses: 2+ radial/ 1+ DP bilateral pulses   Integument/  Skin: No rashes or ulcers  Warm and dry   Musculo-      skeletal: Gait not tested  No calf tenderness   Neuro: GCS 15. Moves all extremities x 4. No slurred speech. No facial droop. Sensation grossly intact. Psych: Alert, oriented x 3           I reviewed the following data:      24 Hour Results:  Recent Results (from the past 24 hour(s))   SAMPLES BEING HELD    Collection Time: 07/21/22 11:52 PM   Result Value Ref Range    SAMPLES BEING HELD 1RED     COMMENT        Add-on orders for these samples will be processed based on acceptable specimen integrity and analyte stability, which may vary by analyte. CBC WITH AUTOMATED DIFF    Collection Time: 07/21/22 11:52 PM   Result Value Ref Range    WBC 7.7 3.6 - 11.0 K/uL    RBC 4.03 3.80 - 5.20 M/uL    HGB 12.6 11.5 - 16.0 g/dL    HCT 37.7 35.0 - 47.0 %    MCV 93.5 80.0 - 99.0 FL    MCH 31.3 26.0 - 34.0 PG    MCHC 33.4 30.0 - 36.5 g/dL    RDW 13.3 11.5 - 14.5 %    PLATELET 417 468 - 011 K/uL    MPV 10.4 8.9 - 12.9 FL    NRBC 0.0 0  WBC    ABSOLUTE NRBC 0.00 0.00 - 0.01 K/uL    NEUTROPHILS 73 32 - 75 %    LYMPHOCYTES 18 12 - 49 %    MONOCYTES 7 5 - 13 %    EOSINOPHILS 1 0 - 7 %    BASOPHILS 0 0 - 1 %    IMMATURE GRANULOCYTES 1 (H) 0.0 - 0.5 %    ABS. NEUTROPHILS 5.6 1.8 - 8.0 K/UL    ABS. LYMPHOCYTES 1.4 0.8 - 3.5 K/UL    ABS. MONOCYTES 0.5 0.0 - 1.0 K/UL    ABS. EOSINOPHILS 0.1 0.0 - 0.4 K/UL    ABS. BASOPHILS 0.0 0.0 - 0.1 K/UL    ABS. IMM.  GRANS. 0.0 0.00 - 0.04 K/UL    DF AUTOMATED     METABOLIC PANEL, COMPREHENSIVE    Collection Time: 07/21/22 11:52 PM   Result Value Ref Range    Sodium 141 136 - 145 mmol/L    Potassium 3.9 3.5 - 5.1 mmol/L    Chloride 107 97 - 108 mmol/L    CO2 30 21 - 32 mmol/L    Anion gap 4 (L) 5 - 15 mmol/L    Glucose 99 65 - 100 mg/dL    BUN 14 6 - 20 MG/DL    Creatinine 0.72 0.55 - 1.02 MG/DL    BUN/Creatinine ratio 19 12 - 20      GFR est AA >60 >60 ml/min/1.73m2    GFR est non-AA >60 >60 ml/min/1.73m2    Calcium 9.3 8.5 - 10.1 MG/DL    Bilirubin, total 0.2 0.2 - 1.0 MG/DL    ALT (SGPT) 22 12 - 78 U/L    AST (SGOT) 13 (L) 15 - 37 U/L    Alk. phosphatase 64 45 - 117 U/L    Protein, total 7.2 6.4 - 8.2 g/dL    Albumin 3.9 3.5 - 5.0 g/dL    Globulin 3.3 2.0 - 4.0 g/dL    A-G Ratio 1.2 1.1 - 2.2       Recent Labs     07/21/22  2352   WBC 7.7   HGB 12.6   HCT 37.7        Recent Labs     07/21/22  2352      K 3.9      CO2 30   GLU 99   BUN 14   CREA 0.72   CA 9.3   ALB 3.9   TBILI 0.2   ALT 22       Imaging  XR CHEST SNGL V     INDICATION: femur fx     COMPARISON: None. FINDINGS: A portable AP radiograph of the chest was obtained at 03:05 hours. The  patient is on a cardiac monitor. The lungs are clear. The cardiac and  mediastinal contours and pulmonary vascularity are normal.  The bones and soft  tissues are grossly within normal limits. IMPRESSION  No acute findings. XR FEMUR LT 2 V     INDICATION: hip fx. COMPARISON: Left hip series of 3 hours prior to this exam.     FINDINGS: Two views of the distal left femur demonstrate no fracture or other  acute osseous, articular or soft tissue abnormality. Prior examination  demonstrates a left femur neck fracture. IMPRESSION  Left femur neck fracture with no distal femoral or knee fracture  evident. Assessment and Plan      Closed fracture of neck of left femur ([address] (7/22/2022)  -admit to orthopedic floor  -await orthopedic surgery consultation  -bedrest for now  -order Tylenol and Morphine prn for pain  -NPO pending any possible surgery today. If no surgery planned, then start regular diet  -will need eventual PT/OT/ rehab    2. Acute left  hip pain  -plan as above    3. Multiple sclerosis   -resume home medications including Baclofen which patient requests    4. Fall from ground level   -with no head/ neck trauma or LOC noted    5. History of left sided weakness  -secondary to MS    6. Bradycardia  -continue HR monitoring    7.  VTE prophylaxis  -SCD to BLE    CODE STATUS/ ADVANCED DIRECTIVE:  DNR (DO NOT RESUSCITATE) as discussed with patient who adamantly requested with same.           Signed by: Sean Velazquez MD    July 22, 2022 at 3:20 AM

## 2022-07-22 NOTE — ANESTHESIA POSTPROCEDURE EVALUATION
Post-Anesthesia Evaluation and Assessment    Patient: Pao Le MRN: [id number]  SSN: [id number]    YOB: 1965  Age: 61 y.o. Sex: female      I have evaluated the patient and they are stable and ready for discharge from the PACU. Cardiovascular Function/Vital Signs  Visit Vitals  BP 98/63   Pulse 82   Temp 37.2 °C (98.9 °F)   Resp 10   Ht 5' 7\" (1.702 m)   Wt 57 kg (125 lb 10.6 oz)   SpO2 95%   BMI 19.68 kg/m²       Patient is status post General anesthesia for Procedure(s):  LEFT PERCUTANEOUS HIP PINNING. Nausea/Vomiting: None    Postoperative hydration reviewed and adequate. Pain:  Pain Scale 1: Numeric (0 - 10) (07/22/22 1715)  Pain Intensity 1: 0 (07/22/22 1715)   Managed    Neurological Status:   Neuro  Neurologic State: Drowsy; Anesthetized; Alert (07/22/22 1715)   At baseline    Mental Status, Level of Consciousness: Alert and  oriented to person, place, and time    Pulmonary Status:   O2 Device: Nasal cannula (07/22/22 1715)   Adequate oxygenation and airway patent    Complications related to anesthesia: None    Post-anesthesia assessment completed. No concerns    Signed By: Salomón Evans MD     July 22, 2022              Procedure(s):  LEFT PERCUTANEOUS HIP PINNING. general    <BSHSIANPOST>    INITIAL Post-op Vital signs:   Vitals Value Taken Time   BP 98/63 07/22/22 1730   Temp 37.2 °C (98.9 °F) 07/22/22 1715   Pulse 96 07/22/22 1730   Resp 14 07/22/22 1730   SpO2 97 % 07/22/22 1730   Vitals shown include unvalidated device data.

## 2022-07-22 NOTE — CONSULTS
ORTHO CONSULT NOTE    Date of Consultation:  2022  Referring Physician:  Shlomo Benz NP  CC: L hip pain    HPI:  Ace Bates is a 61 y.o. female who c/o L hip pain and was found to have L hip fracture in the ED. Pt tripped over her feet at home, she has a chronic L foot drop and generalized weakness in the LLE due to MD. There was no concerning preceding symptoms, or recent medical concerns prior to the fall. Pain is localized, dull, achy at rest, sharp with movement. Moderately well controlled now with current medicines. Denies numbness, new focal weakness, cp, sob, fever, chills, back pain, neck pain, other extremity or joint pain. Current Anticoagulant Medications:  none . Past Medical History:   Diagnosis Date    MS (multiple sclerosis) ([facility] [address]       No past surgical history on file. Family History   Problem Relation Age of Onset    Cancer Mother         colon-age 37    Thyroid Disease Mother     Hypertension Father     Thyroid Disease Sister     OSTEOARTHRITIS Brother       Social History     Tobacco Use    Smoking status: Never    Smokeless tobacco: Not on file   Substance Use Topics    Alcohol use: Yes     Alcohol/week: 3.3 standard drinks     Types: 4 Glasses of wine per week     Allergies   Allergen Reactions    Sulfa (Sulfonamide Antibiotics) Unknown (comments)        Review of Systems:  Per HPI. Objective:     Patient Vitals for the past 8 hrs:   BP Temp Pulse Resp SpO2   22 0935 119/77 97.9 °F (36.6 °C) 85 18 97 %   22 0616 110/65 98.4 °F (36.9 °C) 89 18 97 %   22 0451 105/71 98.1 °F (36.7 °C) 62 16 100 %     Temp (24hrs), Av.1 °F (36.7 °C), Min:97.9 °F (36.6 °C), Max:98.4 °F (36.9 °C)      EXAM:   NAD. Answers questions appropriately. Moves BUE spontaneously with NTTP long bones and joints. RLE no pain PROM, NTTP long bones and joints. Moves foot OK with SILT and CR toes < 2 secs. LLE shortened and externally rotated. Hip skin intact and soft compartments. Knee, ankle and foot NTTP. DF 2/5, PF4/5, EHL 0/5; SILT and CR toes < 2 secs. Bilat calf soft and NTTP. Imaging Review:   Results from Hospital Encounter encounter on 07/22/22    XR CHEST SNGL V    Narrative  EXAM: XR CHEST SNGL V    INDICATION: femur fx    COMPARISON: None. FINDINGS: A portable AP radiograph of the chest was obtained at 03:05 hours. The  patient is on a cardiac monitor. The lungs are clear. The cardiac and  mediastinal contours and pulmonary vascularity are normal.  The bones and soft  tissues are grossly within normal limits. Impression  No acute findings. No results found for this or any previous visit. Labs:   Recent Results (from the past 24 hour(s))   SAMPLES BEING HELD    Collection Time: 07/21/22 11:52 PM   Result Value Ref Range    SAMPLES BEING HELD 1RED     COMMENT        Add-on orders for these samples will be processed based on acceptable specimen integrity and analyte stability, which may vary by analyte. CBC WITH AUTOMATED DIFF    Collection Time: 07/21/22 11:52 PM   Result Value Ref Range    WBC 7.7 3.6 - 11.0 K/uL    RBC 4.03 3.80 - 5.20 M/uL    HGB 12.6 11.5 - 16.0 g/dL    HCT 37.7 35.0 - 47.0 %    MCV 93.5 80.0 - 99.0 FL    MCH 31.3 26.0 - 34.0 PG    MCHC 33.4 30.0 - 36.5 g/dL    RDW 13.3 11.5 - 14.5 %    PLATELET 465 993 - 650 K/uL    MPV 10.4 8.9 - 12.9 FL    NRBC 0.0 0  WBC    ABSOLUTE NRBC 0.00 0.00 - 0.01 K/uL    NEUTROPHILS 73 32 - 75 %    LYMPHOCYTES 18 12 - 49 %    MONOCYTES 7 5 - 13 %    EOSINOPHILS 1 0 - 7 %    BASOPHILS 0 0 - 1 %    IMMATURE GRANULOCYTES 1 (H) 0.0 - 0.5 %    ABS. NEUTROPHILS 5.6 1.8 - 8.0 K/UL    ABS. LYMPHOCYTES 1.4 0.8 - 3.5 K/UL    ABS. MONOCYTES 0.5 0.0 - 1.0 K/UL    ABS. EOSINOPHILS 0.1 0.0 - 0.4 K/UL    ABS. BASOPHILS 0.0 0.0 - 0.1 K/UL    ABS. IMM.  GRANS. 0.0 0.00 - 0.04 K/UL    DF AUTOMATED     METABOLIC PANEL, COMPREHENSIVE    Collection Time: 07/21/22 11:52 PM   Result Value Ref Range    Sodium 141 136 - 145 mmol/L Potassium 3.9 3.5 - 5.1 mmol/L    Chloride 107 97 - 108 mmol/L    CO2 30 21 - 32 mmol/L    Anion gap 4 (L) 5 - 15 mmol/L    Glucose 99 65 - 100 mg/dL    BUN 14 6 - 20 MG/DL    Creatinine 0.72 0.55 - 1.02 MG/DL    BUN/Creatinine ratio 19 12 - 20      GFR est AA >60 >60 ml/min/1.73m2    GFR est non-AA >60 >60 ml/min/1.73m2    Calcium 9.3 8.5 - 10.1 MG/DL    Bilirubin, total 0.2 0.2 - 1.0 MG/DL    ALT (SGPT) 22 12 - 78 U/L    AST (SGOT) 13 (L) 15 - 37 U/L    Alk. phosphatase 64 45 - 117 U/L    Protein, total 7.2 6.4 - 8.2 g/dL    Albumin 3.9 3.5 - 5.0 g/dL    Globulin 3.3 2.0 - 4.0 g/dL    A-G Ratio 1.2 1.1 - 2.2     COVID-19 RAPID TEST    Collection Time: 07/22/22  4:10 AM   Result Value Ref Range    Specimen source Nasopharyngeal      COVID-19 rapid test Not detected NOTD         Impression:     Patient Active Problem List    Diagnosis Date Noted    Multiple sclerosis ([facility] [address] 07/22/2022    Closed fracture of neck of left femur ([facility] [address] 07/22/2022    Acute hip pain, left 07/22/2022    Fall from ground level 07/22/2022    MS (multiple sclerosis) ([facility] [address]      Active Problems:    Multiple sclerosis ([address] (7/22/2022)      Closed fracture of neck of left femur ([facility] [address] (7/22/2022)      Acute hip pain, left (7/22/2022)      Fall from ground level (7/22/2022)        Plan:   I explained the nature of the injury and discussed the recommended surgery. I discussed potential risks/benefits/alternatives of surgery as well as expected hospital course. Patient consents. Plan for L hip fixation with Dr. Debbie Gotti this afternoon pending OR availability. Medical evaluation/clearance pending. Bedrest.  NPO now  Ice. SCDs OK. Hold pre-op anticoagulants. Dr. Debbie Gotti is aware and agrees with above plan.       DENISE Doe  Orthopedic Trauma Service  [facility]

## 2022-07-22 NOTE — PERIOP NOTES
Patient: Dariana Isaac MRN: [id number]  SSN: [id number]   YOB: 1965  Age: 61 y.o. Sex: female     Patient is status post Procedure(s):  LEFT PERCUTANEOUS HIP PINNING. Surgeon(s) and Role:     * Sabiha Lynch MD - Primary    Local/Dose/Irrigation:  0.5% Bupivacaine with epi 20 ml injected to left hip.                    Peripheral IV 07/21/22 Left Antecubital (Active)   Site Assessment Clean, dry, & intact 07/22/22 0935   Phlebitis Assessment 0 07/22/22 0935   Infiltration Assessment 1 07/22/22 0935   Dressing Status Clean, dry, & intact 07/22/22 0935   Dressing Type Transparent 07/22/22 0935   Hub Color/Line Status Infusing 07/22/22 0935   Action Taken Open ports on tubing capped 07/22/22 0935   Alcohol Cap Used Yes 07/22/22 0935                       Dressing/Packing:  Incision 07/22/22 Hip Left-Dressing/Treatment: Skin glue;Silver dressing (07/22/22 1544)

## 2022-07-22 NOTE — PROGRESS NOTES
TRANSFER - OUT REPORT:    Verbal report given to [facility], RN(name) on Michelle Morin  being transferred to [facility](unit) for routine post - op       Report consisted of patients Situation, Background, Assessment and   Recommendations(SBAR). Information from the following report(s) SBAR, Kardex, OR Summary, Procedure Summary, MAR, and Cardiac Rhythm NSR  was reviewed with the receiving nurse. Lines:   Peripheral IV 07/21/22 Left Antecubital (Active)   Site Assessment Clean, dry, & intact 07/22/22 1715   Phlebitis Assessment 0 07/22/22 1715   Infiltration Assessment 0 07/22/22 1715   Dressing Status Clean, dry, & intact 07/22/22 1715   Dressing Type Transparent;Tape 07/22/22 1715   Hub Color/Line Status Pink; Infusing;Flushed;Patent 07/22/22 1715   Action Taken Open ports on tubing capped 07/22/22 1715   Alcohol Cap Used Yes 07/22/22 1715        Opportunity for questions and clarification was provided.       Patient transported with:   "[employer]"

## 2022-07-22 NOTE — ED NOTES
Has a final transfer review been performed? Yes    Reason for Admission: Hip Fracture  Patient comes from: Private Residence  Mental Status: Alert and oriented  ADL:partial assistance  Ambulation:Normally ambulatory but currently bed bound due to fracture  Pertinent Info/Safety Concerns: N/A  COVID Status: Negative  MEWS Score: 1  Vitals:    07/21/22 2304 07/22/22 0451   BP: 103/68 105/71   Pulse: (!) 56 62   Resp: 16 16   Temp: 98.1 °F (36.7 °C) 98.1 °F (36.7 °C)   SpO2: 100% 100%     Transport mode:ED stretcher    TRANSFER - OUT REPORT:    Neva Covarrubias  being transferred to (unit) for routine progression of care     \"Notification of etransfer note given to (Name) Harsh De La Paz (Title) RN    Report consisted of patient's Situation, Background, Assessment and   Recommendations(SBAR). Lines:   Peripheral IV 07/21/22 Left Antecubital (Active)   Site Assessment Clean, dry, & intact 07/21/22 2353   Phlebitis Assessment 0 07/21/22 2353   Infiltration Assessment 0 07/21/22 2353   Dressing Status Clean, dry, & intact 07/21/22 2353   Dressing Type 4 X 4;Transparent 07/21/22 2353   Hub Color/Line Status Pink 07/21/22 2353   Action Taken Blood drawn 07/21/22 2353        Opportunity for questions and clarification was provided.

## 2022-07-22 NOTE — PROGRESS NOTES
Transitions of Care:    RUR - 4%    Disposition: ? IPR vs HH - will await PT/OT recommendations post op    Transport: Family    Reason for Admission:  Left hip pain from GLF                      RUR Score:          4%           Plan for utilizing home health:      Agreeable to home care [facility]) if needed    PCP: First and Last name:  Jb Beltre MD     Name of Practice:    Are you a current patient: Yes/No: yes   Approximate date of last visit: has next visit scheduled end of August   Can you participate in a virtual visit with your PCP: no                    Current Advanced Directive/Advance Care Plan: Full Code      Healthcare Decision Maker:   Click here to complete [address] including selection of the Healthcare Decision Maker Relationship (ie \"Primary\")    Cecily Loomis spouse [phone]                             Transition of Care Plan:           CM met with linda - confirmed demographics on file-  patient independent with ADL's prior to admit and uses no DME - patient has dx of MS - works with her  who is a  - discussed option of IPR vs HH and will wait upon PT/OT reccomendations upon discharge. Transition of Care Plan:     The Plan for Transition of Care is related to the following treatment goals: HH vs IPR    The Patient and/or patient representative IPR vs HH was provided with a choice of provider and agrees  with the discharge plan. Yes [x] No []    A Freedom of choice list was provided with basic dialogue that supports the patient's individualized plan of care/goals and shares the quality data associated with the providers. Yes [x] No []    Has chosen [facility] for home services if needed. Referral made via [facility] to see if agency can accept if needed.      For IPR choice:     1st -[facility]   2nd- [facility]     can transport upon discharge BEN Moses

## 2022-07-22 NOTE — CONSULTS
FRACTURE CONSULT NOTE    Subjective:     Date of Consultation:  July 22, 2022  Referring Magdi Nunez is a 61 y.o. female who sustained a GLF PTA onto her left side. She had pain afterwards and was admitted to the hospitalist service. Xrays revealed a displaced left femoral neck fracture. No other complaints. She has MS and her left lower extremity is significantly weakened. Patient Active Problem List    Diagnosis Date Noted    Multiple sclerosis ([facility] 07/22/2022    [facility] left femur ([facility] 07/22/2022    Acute hip pain, left 07/22/2022    Fall from ground level 07/22/2022    MS (multiple sclerosis) ([facility]      Family History   Problem Relation Age of Onset    Cancer Mother         colon-age 37    Thyroid Disease Mother     Hypertension Father     Thyroid Disease Sister     OSTEOARTHRITIS Brother       Social History     Tobacco Use    Smoking status: Never    Smokeless tobacco: Not on file   Substance Use Topics    Alcohol use: Yes     Alcohol/week: 3.3 standard drinks     Types: 4 Glasses of wine per week     Past Medical History:   Diagnosis Date    MS (multiple sclerosis) ([facility]       History reviewed. No pertinent surgical history. Prior to Admission medications    Medication Sig Start Date End Date Taking? Authorizing Provider   baclofen (LIORESAL) 10 mg tablet Take 10 mg by mouth four (4) times daily. Yes Other, MD Geoff   FLUoxetine (PROzac) 10 mg capsule Take 10 mg by mouth in the morning. Indications: anxiousness associated with depression   Yes Provider, Historical   nirmatrelvir-ritonavir (PAXLOVID) 150 mg x 2- 100 mg tablet As dir  Patient not taking: Reported on 7/22/2022 7/5/22   Colleen Kirkland MD   fluocinoNIDE (LIDEX) 0.05 % topical cream Apply  to affected area two (2) times a day. Patient not taking: Reported on 7/22/2022 5/16/17   Colleen Kirkland MD   glatiramer (COPAXONE) 20 mg/mL injection 20 mg by SubCUTAneous route daily.   Patient not taking: Reported on 7/22/2022    Provider, Historical     Current Facility-Administered Medications   Medication Dose Route Frequency    sodium chloride (NS) flush 5-40 mL  5-40 mL IntraVENous Q8H    sodium chloride (NS) flush 5-40 mL  5-40 mL IntraVENous PRN    acetaminophen (TYLENOL) tablet 650 mg  650 mg Oral Q6H PRN    Or    acetaminophen (TYLENOL) suppository 650 mg  650 mg Rectal Q6H PRN    polyethylene glycol (MIRALAX) packet 17 g  17 g Oral DAILY PRN    ondansetron (ZOFRAN ODT) tablet 4 mg  4 mg Oral Q8H PRN    Or    ondansetron (ZOFRAN) injection 4 mg  4 mg IntraVENous Q6H PRN    morphine injection 1 mg  1 mg IntraVENous Q4H PRN    baclofen (LIORESAL) tablet 10 mg  10 mg Oral QID    FLUoxetine (PROzac) capsule 10 mg  10 mg Oral DAILY    HYDROmorphone (DILAUDID) injection 0.5 mg  0.5 mg IntraVENous Q4H PRN    lidocaine 4 % patch 1 Patch  1 Patch TransDERmal Q24H    oxyCODONE IR (ROXICODONE) tablet 5 mg  5 mg Oral Q4H PRN    traMADoL (ULTRAM) tablet 50 mg  50 mg Oral Q6H PRN    sodium chloride irrigation 0.9 % Irrigation   Irrigation ONCE    lactated Ringers infusion 1,000 mL  1,000 mL IntraVENous CONTINUOUS    0.9% sodium chloride infusion  25 mL/hr IntraVENous CONTINUOUS    lidocaine (PF) (XYLOCAINE) 10 mg/mL (1 %) injection 0.1 mL  0.1 mL SubCUTAneous PRN    fentaNYL citrate (PF) injection 50 mcg  50 mcg IntraVENous PRN    midazolam (VERSED) injection 1 mg  1 mg IntraVENous PRN    midazolam (VERSED) injection 1 mg  1 mg IntraVENous PRN    glycopyrrolate (ROBINUL) injection 0.2 mg  0.2 mg IntraVENous ONCE PRN    ropivacaine (PF) (NAROPIN) 5 mg/mL (0.5 %) injection 30 mL  30 mL Peripheral Nerve Block PRN    bupivacaine-EPINEPHrine (PF) (SENSORCAINE PF) 0.5 %-1:200,000 injection    PRN     Facility-Administered Medications Ordered in Other Encounters   Medication Dose Route Frequency    midazolam (VERSED) injection   IntraVENous PRN    fentaNYL citrate (PF) injection   IntraVENous PRN    lidocaine (PF) (XYLOCAINE) 20 mg/mL (2 %) injection   IntraVENous PRN    propofoL (DIPRIVAN) 10 mg/mL injection   IntraVENous PRN    rocuronium injection   IntraVENous PRN    succinylcholine (ANECTINE) injection   IntraVENous PRN    ondansetron (ZOFRAN) injection   IntraVENous PRN    dexamethasone (DECADRON) 4 mg/mL injection   IntraVENous PRN    PHENYLephrine (CHARLES-SYNEPHRINE) 10 mg in 0.9% sodium chloride 250 mL infusion   IntraVENous CONTINUOUS    PHENYLephrine (NEOSYNEPHRINE) in NS syringe   IntraVENous PRN    dexmedeTOMidine (PRECEDEX) 100 mcg/mL iv solution   IntraVENous PRN    HYDROmorphone (PF) (DILAUDID) injection   IntraVENous PRN      Allergies   Allergen Reactions    Sulfa (Sulfonamide Antibiotics) Unknown (comments)        Review of Systems:    Negative for fevers, chills, nausea, vomiting, chest pain, shortness of breath, headaches.         Objective:     Patient Vitals for the past 24 hrs:   Temp Pulse Resp BP SpO2   22 1432 98.5 °F (36.9 °C) 85 16 119/76 94 %   22 0935 97.9 °F (36.6 °C) 85 18 119/77 97 %   22 0616 98.4 °F (36.9 °C) 89 18 110/65 97 %   22 0451 98.1 °F (36.7 °C) 62 16 105/71 100 %   22 2304 98.1 °F (36.7 °C) (!) 56 16 103/68 100 %       Temp (24hrs), Av.2 °F (36.8 °C), Min:97.9 °F (36.6 °C), Max:98.5 °F (36.9 °C)      Gen: NAD, A&Ox3  Resp: Non-labored breathing  CV: Extremities well perfused  Abd: soft, NT  LLE: slightly rotated, did not range due to pain, skin intact, warm well perfused, SILT in al distributions L3-S1, palpable pedal pulses, no calf tenderness  RLE: no pain with ROM, no swelling about knee or ankle, skin intact, warm well perfused, SILT in al distributions L3-S1, palpable pedal pulses, no calf tenderness    Imaging Review: Mildly displaced left femoral neck fracture    Labs:   Recent Results (from the past 24 hour(s))   SAMPLES BEING HELD    Collection Time: 22 11:52 PM   Result Value Ref Range    SAMPLES BEING HELD 1RED     COMMENT Add-on orders for these samples will be processed based on acceptable specimen integrity and analyte stability, which may vary by analyte. CBC WITH AUTOMATED DIFF    Collection Time: 07/21/22 11:52 PM   Result Value Ref Range    WBC 7.7 3.6 - 11.0 K/uL    RBC 4.03 3.80 - 5.20 M/uL    HGB 12.6 11.5 - 16.0 g/dL    HCT 37.7 35.0 - 47.0 %    MCV 93.5 80.0 - 99.0 FL    MCH 31.3 26.0 - 34.0 PG    MCHC 33.4 30.0 - 36.5 g/dL    RDW 13.3 11.5 - 14.5 %    PLATELET 640 361 - 847 K/uL    MPV 10.4 8.9 - 12.9 FL    NRBC 0.0 0  WBC    ABSOLUTE NRBC 0.00 0.00 - 0.01 K/uL    NEUTROPHILS 73 32 - 75 %    LYMPHOCYTES 18 12 - 49 %    MONOCYTES 7 5 - 13 %    EOSINOPHILS 1 0 - 7 %    BASOPHILS 0 0 - 1 %    IMMATURE GRANULOCYTES 1 (H) 0.0 - 0.5 %    ABS. NEUTROPHILS 5.6 1.8 - 8.0 K/UL    ABS. LYMPHOCYTES 1.4 0.8 - 3.5 K/UL    ABS. MONOCYTES 0.5 0.0 - 1.0 K/UL    ABS. EOSINOPHILS 0.1 0.0 - 0.4 K/UL    ABS. BASOPHILS 0.0 0.0 - 0.1 K/UL    ABS. IMM. GRANS. 0.0 0.00 - 0.04 K/UL    DF AUTOMATED     METABOLIC PANEL, COMPREHENSIVE    Collection Time: 07/21/22 11:52 PM   Result Value Ref Range    Sodium 141 136 - 145 mmol/L    Potassium 3.9 3.5 - 5.1 mmol/L    Chloride 107 97 - 108 mmol/L    CO2 30 21 - 32 mmol/L    Anion gap 4 (L) 5 - 15 mmol/L    Glucose 99 65 - 100 mg/dL    BUN 14 6 - 20 MG/DL    Creatinine 0.72 0.55 - 1.02 MG/DL    BUN/Creatinine ratio 19 12 - 20      GFR est AA >60 >60 ml/min/1.73m2    GFR est non-AA >60 >60 ml/min/1.73m2    Calcium 9.3 8.5 - 10.1 MG/DL    Bilirubin, total 0.2 0.2 - 1.0 MG/DL    ALT (SGPT) 22 12 - 78 U/L    AST (SGOT) 13 (L) 15 - 37 U/L    Alk.  phosphatase 64 45 - 117 U/L    Protein, total 7.2 6.4 - 8.2 g/dL    Albumin 3.9 3.5 - 5.0 g/dL    Globulin 3.3 2.0 - 4.0 g/dL    A-G Ratio 1.2 1.1 - 2.2     COVID-19 RAPID TEST    Collection Time: 07/22/22  4:10 AM   Result Value Ref Range    Specimen source Nasopharyngeal      COVID-19 rapid test Not detected NOTD     TYPE & SCREEN    Collection Time: 07/22/22  1:25 PM   Result Value Ref Range    Crossmatch Expiration 07/25/2022,2359     ABO/Rh(D) Maurice Krishna POSITIVE     Antibody screen NEG    PTT    Collection Time: 07/22/22  1:25 PM   Result Value Ref Range    aPTT 27.2 22.1 - 31.0 sec    aPTT, therapeutic range     58.0 - 77.0 SECS   PROTHROMBIN TIME + INR    Collection Time: 07/22/22  1:25 PM   Result Value Ref Range    INR 1.0 0.9 - 1.1      Prothrombin time 10.8 9.0 - 11.1 sec         Current Facility-Administered Medications   Medication Dose Route Frequency    sodium chloride (NS) flush 5-40 mL  5-40 mL IntraVENous Q8H    sodium chloride (NS) flush 5-40 mL  5-40 mL IntraVENous PRN    acetaminophen (TYLENOL) tablet 650 mg  650 mg Oral Q6H PRN    Or    acetaminophen (TYLENOL) suppository 650 mg  650 mg Rectal Q6H PRN    polyethylene glycol (MIRALAX) packet 17 g  17 g Oral DAILY PRN    ondansetron (ZOFRAN ODT) tablet 4 mg  4 mg Oral Q8H PRN    Or    ondansetron (ZOFRAN) injection 4 mg  4 mg IntraVENous Q6H PRN    morphine injection 1 mg  1 mg IntraVENous Q4H PRN    baclofen (LIORESAL) tablet 10 mg  10 mg Oral QID    FLUoxetine (PROzac) capsule 10 mg  10 mg Oral DAILY    HYDROmorphone (DILAUDID) injection 0.5 mg  0.5 mg IntraVENous Q4H PRN    lidocaine 4 % patch 1 Patch  1 Patch TransDERmal Q24H    oxyCODONE IR (ROXICODONE) tablet 5 mg  5 mg Oral Q4H PRN    traMADoL (ULTRAM) tablet 50 mg  50 mg Oral Q6H PRN    sodium chloride irrigation 0.9 % Irrigation   Irrigation ONCE    lactated Ringers infusion 1,000 mL  1,000 mL IntraVENous CONTINUOUS    0.9% sodium chloride infusion  25 mL/hr IntraVENous CONTINUOUS    lidocaine (PF) (XYLOCAINE) 10 mg/mL (1 %) injection 0.1 mL  0.1 mL SubCUTAneous PRN    fentaNYL citrate (PF) injection 50 mcg  50 mcg IntraVENous PRN    midazolam (VERSED) injection 1 mg  1 mg IntraVENous PRN    midazolam (VERSED) injection 1 mg  1 mg IntraVENous PRN    glycopyrrolate (ROBINUL) injection 0.2 mg  0.2 mg IntraVENous ONCE PRN    ropivacaine (PF) (NAROPIN) 5 mg/mL (0.5 %) injection 30 mL  30 mL Peripheral Nerve Block PRN    bupivacaine-EPINEPHrine (PF) (SENSORCAINE PF) 0.5 %-1:200,000 injection    PRN     Facility-Administered Medications Ordered in Other Encounters   Medication Dose Route Frequency    midazolam (VERSED) injection   IntraVENous PRN    fentaNYL citrate (PF) injection   IntraVENous PRN    lidocaine (PF) (XYLOCAINE) 20 mg/mL (2 %) injection   IntraVENous PRN    propofoL (DIPRIVAN) 10 mg/mL injection   IntraVENous PRN    rocuronium injection   IntraVENous PRN    succinylcholine (ANECTINE) injection   IntraVENous PRN    ondansetron (ZOFRAN) injection   IntraVENous PRN    dexamethasone (DECADRON) 4 mg/mL injection   IntraVENous PRN    PHENYLephrine (CHARLES-SYNEPHRINE) 10 mg in 0.9% sodium chloride 250 mL infusion   IntraVENous CONTINUOUS    PHENYLephrine (NEOSYNEPHRINE) in NS syringe   IntraVENous PRN    dexmedeTOMidine (PRECEDEX) 100 mcg/mL iv solution   IntraVENous PRN    HYDROmorphone (PF) (DILAUDID) injection   IntraVENous PRN         Impression:     Active Problems:    Multiple sclerosis (HCC) (7/22/2022)      Closed fracture of neck of left femur (HCC) (7/22/2022)      Acute hip pain, left (7/22/2022)      Fall from ground level (7/22/2022)    62 yo female with hx of progressive MS with limited motor strength in the left lower extremity who sustained a GLF resulting in a displaced left femoral neck fracture. She was admitted by the hospitalist and deemed medically optimized. I discussed options including total hip vs katharine ve percutaneous pinning left hip. We discussed the decreased healing capacity of displaced intra-articular femoral neck fractures versus the possible risk of dislocaion wih joint arthroplastyh. We elected to proceed with perc pinning if adequate reduction was able to be achieved    They understand elevated risk of surgical medical complication due to bone quality and history of MS.      Plan:   PLAN for RICHARD vs ktaharine vs perc pinning left hip    Risks and benefits of joint arthroplasty discussed at length including but not limited to bleeding, need for blood transfusion, infection, damage to surrounding structures, intra-operative fracture, blood clots, pulmonary embolism, death. The patient understands the risks of surgery. They elected to move forward.          Pardeep Frances MD

## 2022-07-22 NOTE — OP NOTES
Name: Neva Covarrubias  MRN:  [id number]  : 1965  Age:  61 y.o. Surgery Date: 2022      OPERATIVE REPORT -LEFT HIP PERCUTANEOUS PINNING      PREOPERATIVE DIAGNOSIS: Left femoral neck [id number]    POSTOPERATIVE DIAGNOSIS: Left femoral neck [id number]    PROCEDURE PERFORMED: Percutaneous pinning left hip    SURGEON: Sharon Burton MD    FIRST ASSISTANT:  Laz Courtney PA-C    ANESTHESIA: General    PRE-OP ANTIBIOTIC: Ancef 2g    COMPLICATIONS: None. ESTIMATED BLOOD LOSS: 30 mL    SPECIMENS REMOVED: None. COMPONENTS IMPLANTED:   Implant Name Type Inv. Item Serial No.  Lot No. LRB No. Used Action   SCREW BNE L80MM DIA6.5MM THRD L16MM CANC S STL SELF DRL ST - SNA  SCREW BNE L80MM DIA6.5MM THRD L16MM CANC S STL SELF DRL ST NA [employer]WD NA Left 2 Implanted   SCREW BNE L85MM DIA6.5MM THRD L16MM CANC S STL SELF DRL ST - SNA  SCREW BNE L85MM DIA6.5MM THRD L16MM CANC S STL SELF DRL ST NA [employer]_WD NA Left 1 Implanted       INDICATIONS: The patient is an 61 yrs female with a displaced left femoral neck fracture. Pre-operative medical optimization was confirmed with the medical team. We discussed joint arthroplasty vs percutaneous pinning. She and her  elected to proceed with percutaneous pinning left hip. Risks, benefits, alternatives of the procedure were reviewed in detail and they desired to proceed. The patient understood risks of surgery including bleeding, infection, blood clots, and death. DESCRIPTION OF PROCEDURE:   The patient was identified in the pre-operative holding area and the correct signed was marked and confirmed. They were brought to the OR and placed supine on a HANA table. General anesthesia was induced. The patient was prepped and draped in a standard sterile fashion. After a time out was held, xrays were taken to confirm fracture site as well as entry for skin incision.  The trajectory for the pin was measured using a mitchell over the skin. A 2 cm incision was made in this trajectory. Three guide wires were drilled in an inverted triangle pattern. These were measured and the cortices were overdrilled. Three partially threaded cancellous screws were advanced within 2 mm of cortical bone with appropriate spread. Guide wires were removed. Final shots were taken. After copious irrigation, the skin and subcutaneous were closed in a standard fashion with 2-0 vicryl and 3-0 monocryl followed by Dermabond. Sterile dressings were applied. There were no complications. No specimen was sent. The patient was transferred to the recovery room in stable condition. I was present for the entirety of the case.     Jefferson Haney MD

## 2022-07-22 NOTE — PROGRESS NOTES
I prayed with Yuri More and her  and celebrated with Yuri Mroe the [address].   Father Mark Abdullahi

## 2022-07-22 NOTE — ED PROVIDER NOTES
26-year-old female with history of MS, left foot drop presents after mechanical fall tonight at 10 PM with hip pain. She did not hit her head or lose consciousness. She denies any numbness or tingling anywhere. She has been unable to bear weight on her left lower extremity since the fall. She denies any other injuries. Fall    Hip Pain        Past Medical History:   Diagnosis Date    MS (multiple sclerosis) ([facility]        No past surgical history on file. Family History:   Problem Relation Age of Onset    Cancer Mother         colon-age 37    Thyroid Disease Mother     Hypertension Father     Thyroid Disease Sister     OSTEOARTHRITIS Brother        Social History     Socioeconomic History    Marital status:      Spouse name: Not on file    Number of children: Not on file    Years of education: Not on file    Highest education level: Not on file   Occupational History    Not on file   Tobacco Use    Smoking status: Never    Smokeless tobacco: Not on file   Substance and Sexual Activity    Alcohol use: Yes     Alcohol/week: 3.3 standard drinks     Types: 4 Glasses of wine per week    Drug use: No    Sexual activity: Not on file   Other Topics Concern    Not on file   Social History Narrative    Not on file     Social Determinants of Health     Financial Resource Strain: Not on file   Food Insecurity: Not on file   Transportation Needs: Not on file   Physical Activity: Not on file   Stress: Not on file   Social Connections: Not on file   Intimate Partner Violence: Not on file   Housing Stability: Not on file         ALLERGIES: Sulfa (sulfonamide antibiotics)    Review of Systems   All other systems reviewed and are negative. Vitals:    07/21/22 2304   BP: 103/68   Pulse: (!) 56   Resp: 16   Temp: 98.1 °F (36.7 °C)   SpO2: 100%            Physical Exam  Vitals and nursing note reviewed. Constitutional:       General: She is not in acute distress. HENT:      Head: Normocephalic and atraumatic. Mouth/Throat:      Mouth: Mucous membranes are moist.   Eyes:      General: No scleral icterus. Conjunctiva/sclera: Conjunctivae normal.      Pupils: Pupils are equal, round, and reactive to light. Neck:      Trachea: No tracheal deviation. Cardiovascular:      Rate and Rhythm: Normal rate and regular rhythm. Pulses: Normal pulses. Pulmonary:      Effort: Pulmonary effort is normal. No respiratory distress. Breath sounds: No wheezing or rales. Abdominal:      General: There is no distension. Palpations: Abdomen is soft. Tenderness: There is no abdominal tenderness. Genitourinary:     Comments: deferred  Musculoskeletal:         General: Tenderness (left groin) present. No deformity. Cervical back: Neck supple. Skin:     General: Skin is warm and dry. Neurological:      General: No focal deficit present. Mental Status: She is alert. Sensory: No sensory deficit. Motor: No weakness. Psychiatric:         Mood and Affect: Mood normal.        MDM         Procedures        Perfect Serve Consult for Admission  2:46 AM    ED Room Number: R32/R32  Patient Name and age:  Nolberto All 61 y.o.  female  Working Diagnosis:   1. Closed fracture of neck of left femur, initial encounter ([facility]        COVID-19 Suspicion:  no  Sepsis present:  no  Reassessment needed: no  Code Status:  Full Code  Readmission: no  Isolation Requirements:  no  Recommended Level of Care:  med/surg  Department:[facility] Adult ED - 21   Other: Has a history of MS and left foot drop. Had mechanical fall tonight. Orthopedics aware.

## 2022-07-23 LAB
ANION GAP SERPL CALC-SCNC: 5 MMOL/L (ref 5–15)
BASOPHILS # BLD: 0 K/UL (ref 0–0.1)
BASOPHILS NFR BLD: 0 % (ref 0–1)
BUN SERPL-MCNC: 8 MG/DL (ref 6–20)
BUN/CREAT SERPL: 15 (ref 12–20)
CALCIUM SERPL-MCNC: 8.6 MG/DL (ref 8.5–10.1)
CHLORIDE SERPL-SCNC: 109 MMOL/L (ref 97–108)
CO2 SERPL-SCNC: 27 MMOL/L (ref 21–32)
CREAT SERPL-MCNC: 0.55 MG/DL (ref 0.55–1.02)
DIFFERENTIAL METHOD BLD: ABNORMAL
EOSINOPHIL # BLD: 0.1 K/UL (ref 0–0.4)
EOSINOPHIL NFR BLD: 2 % (ref 0–7)
ERYTHROCYTE [DISTWIDTH] IN BLOOD BY AUTOMATED COUNT: 13.2 % (ref 11.5–14.5)
GLUCOSE SERPL-MCNC: 83 MG/DL (ref 65–100)
HCT VFR BLD AUTO: 34.4 % (ref 35–47)
HGB BLD-MCNC: 11.5 G/DL (ref 11.5–16)
IMM GRANULOCYTES # BLD AUTO: 0 K/UL (ref 0–0.04)
IMM GRANULOCYTES NFR BLD AUTO: 0 % (ref 0–0.5)
LYMPHOCYTES # BLD: 1 K/UL (ref 0.8–3.5)
LYMPHOCYTES NFR BLD: 17 % (ref 12–49)
MAGNESIUM SERPL-MCNC: 2 MG/DL (ref 1.6–2.4)
MCH RBC QN AUTO: 30.9 PG (ref 26–34)
MCHC RBC AUTO-ENTMCNC: 33.4 G/DL (ref 30–36.5)
MCV RBC AUTO: 92.5 FL (ref 80–99)
MONOCYTES # BLD: 0.6 K/UL (ref 0–1)
MONOCYTES NFR BLD: 11 % (ref 5–13)
NEUTS SEG # BLD: 4.2 K/UL (ref 1.8–8)
NEUTS SEG NFR BLD: 70 % (ref 32–75)
NRBC # BLD: 0 K/UL (ref 0–0.01)
NRBC BLD-RTO: 0 PER 100 WBC
PLATELET # BLD AUTO: 177 K/UL (ref 150–400)
PMV BLD AUTO: 10.2 FL (ref 8.9–12.9)
POTASSIUM SERPL-SCNC: 3.6 MMOL/L (ref 3.5–5.1)
RBC # BLD AUTO: 3.72 M/UL (ref 3.8–5.2)
SODIUM SERPL-SCNC: 141 MMOL/L (ref 136–145)
WBC # BLD AUTO: 6 K/UL (ref 3.6–11)

## 2022-07-23 PROCEDURE — 65270000029 HC RM PRIVATE

## 2022-07-23 PROCEDURE — 74011250637 HC RX REV CODE- 250/637: Performed by: PHYSICIAN ASSISTANT

## 2022-07-23 PROCEDURE — 97165 OT EVAL LOW COMPLEX 30 MIN: CPT

## 2022-07-23 PROCEDURE — 94761 N-INVAS EAR/PLS OXIMETRY MLT: CPT

## 2022-07-23 PROCEDURE — 97116 GAIT TRAINING THERAPY: CPT

## 2022-07-23 PROCEDURE — 74011000250 HC RX REV CODE- 250: Performed by: PHYSICIAN ASSISTANT

## 2022-07-23 PROCEDURE — 94760 N-INVAS EAR/PLS OXIMETRY 1: CPT

## 2022-07-23 PROCEDURE — 36415 COLL VENOUS BLD VENIPUNCTURE: CPT

## 2022-07-23 PROCEDURE — 97530 THERAPEUTIC ACTIVITIES: CPT

## 2022-07-23 PROCEDURE — 97161 PT EVAL LOW COMPLEX 20 MIN: CPT

## 2022-07-23 PROCEDURE — 77010033678 HC OXYGEN DAILY

## 2022-07-23 PROCEDURE — 74011250636 HC RX REV CODE- 250/636: Performed by: PHYSICIAN ASSISTANT

## 2022-07-23 PROCEDURE — 80048 BASIC METABOLIC PNL TOTAL CA: CPT

## 2022-07-23 PROCEDURE — 85025 COMPLETE CBC W/AUTO DIFF WBC: CPT

## 2022-07-23 PROCEDURE — 97535 SELF CARE MNGMENT TRAINING: CPT

## 2022-07-23 PROCEDURE — 83735 ASSAY OF MAGNESIUM: CPT

## 2022-07-23 RX ADMIN — FAMOTIDINE 20 MG: 20 TABLET ORAL at 08:58

## 2022-07-23 RX ADMIN — DOCUSATE SODIUM 50MG AND SENNOSIDES 8.6MG 1 TABLET: 8.6; 5 TABLET, FILM COATED ORAL at 17:39

## 2022-07-23 RX ADMIN — POLYETHYLENE GLYCOL 3350 17 G: 17 POWDER, FOR SOLUTION ORAL at 08:58

## 2022-07-23 RX ADMIN — BACLOFEN 10 MG: 10 TABLET ORAL at 17:39

## 2022-07-23 RX ADMIN — ASPIRIN 81 MG: 81 TABLET, COATED ORAL at 08:58

## 2022-07-23 RX ADMIN — BACLOFEN 10 MG: 10 TABLET ORAL at 22:14

## 2022-07-23 RX ADMIN — BACLOFEN 10 MG: 10 TABLET ORAL at 12:17

## 2022-07-23 RX ADMIN — ACETAMINOPHEN 650 MG: 325 TABLET ORAL at 22:14

## 2022-07-23 RX ADMIN — ACETAMINOPHEN 650 MG: 325 TABLET ORAL at 03:11

## 2022-07-23 RX ADMIN — ASPIRIN 81 MG: 81 TABLET, COATED ORAL at 17:39

## 2022-07-23 RX ADMIN — SODIUM CHLORIDE, PRESERVATIVE FREE 10 ML: 5 INJECTION INTRAVENOUS at 14:00

## 2022-07-23 RX ADMIN — FAMOTIDINE 20 MG: 20 TABLET ORAL at 17:39

## 2022-07-23 RX ADMIN — SODIUM CHLORIDE, PRESERVATIVE FREE 10 ML: 5 INJECTION INTRAVENOUS at 22:14

## 2022-07-23 RX ADMIN — CEFAZOLIN 2 G: 1 INJECTION, POWDER, FOR SOLUTION INTRAMUSCULAR; INTRAVENOUS at 07:38

## 2022-07-23 RX ADMIN — ACETAMINOPHEN 650 MG: 325 TABLET ORAL at 16:08

## 2022-07-23 RX ADMIN — BACLOFEN 10 MG: 10 TABLET ORAL at 08:58

## 2022-07-23 RX ADMIN — DOCUSATE SODIUM 50MG AND SENNOSIDES 8.6MG 1 TABLET: 8.6; 5 TABLET, FILM COATED ORAL at 08:58

## 2022-07-23 RX ADMIN — FLUOXETINE 10 MG: 10 CAPSULE ORAL at 09:04

## 2022-07-23 NOTE — PROGRESS NOTES
Problem: Self Care Deficits Care Plan (Adult)  Goal: *Acute Goals and Plan of Care (Insert Text)  Description: FUNCTIONAL STATUS PRIOR TO ADMISSION: Patient was modified independent using a walking stick in the home. Uses a wheelchair and/or power wheelchair  in the community. Pt was independent in ADLs. Pt with MS at baseline with affected L sided weakness. Has AFO for L foot drop. HOME SUPPORT: The patient lived with  but did not require assist.    Occupational Therapy Goals  Initiated 7/23/2022  1. Patient will perform bathing with minimal assistance/contact guard assist within 7 day(s). 2.  Patient will perform grooming at sink with supervision/set-up within 7 day(s). 3.  Patient will perform toilet transfers with supervision/set-up within 7 day(s). 4.  Patient will perform all aspects of toileting with supervision/set-up within 7 day(s). Outcome: Not Met     OCCUPATIONAL THERAPY EVALUATION  Patient: Jeanna Cavazos (1 y.o. female)  Date: 7/23/2022  Primary Diagnosis: Closed fracture of neck of left femur (HCC) [S72.002A]  Acute hip pain, left [M25.552]  Fall from ground level [W18.30XA]  Multiple sclerosis (HCC) [G35]  Procedure(s) (LRB):  LEFT PERCUTANEOUS HIP PINNING (Left) 1 Day Post-Op   Precautions:   Fall, PWB (50% WB LLE)    ASSESSMENT  Based on the objective data described below, the patient presents with impaired functional mobility and generalized weakness following left percutaneous hip pinning post-op day 1. Pt cleared for therapy by nursing and received supine in bed with  in room and eager to work with therapy. Completed bed mobility to sit EOB (Min A) and sit>stand with RW (Min Ax1). Pt completed functional mobility to bathroom (extra time) for toileting and grooming at sink. Returned to sit upright in chair. Educated pt on AE for LB dressing with pt demo'ing good understanding. At baseline, pt with MS and L sided weakness.  Pt will be home alone during the day with 1-2 steps to navigate within house. Unable to push self in wheelchair due to LUE weakness, steps and home set up. Currently recommend IPR at discharge with pt very motivated to participate in therapy. Current Level of Function Impacting Discharge (ADLs/self-care): Min A for functional mobility, Max A for LLE ADLs (Mod I for RLE)     Functional Outcome Measure: The patient scored Total: 70/100 on the Barthel Index outcome measure which is indicative of being minimally independent in basic self-care. Other factors to consider for discharge: alone during day, partial WB     Patient will benefit from skilled therapy intervention to address the above noted impairments. PLAN :  Recommendations and Planned Interventions: self care training, functional mobility training, therapeutic exercise, balance training, therapeutic activities, endurance activities, patient education, home safety training, and family training/education    Frequency/Duration: Patient will be followed by occupational therapy 5 times a week to address goals. Recommendation for discharge: (in order for the patient to meet his/her long term goals)  Therapy 3 hours per day 5-7 days per week vs [facility] pending progress    This discharge recommendation:  Has not yet been discussed the attending provider and/or case management    IF patient discharges home will need the following DME: patient owns DME required for discharge       SUBJECTIVE:   Patient stated I did better than I thought I would.     OBJECTIVE DATA SUMMARY:   HISTORY:   Past Medical History:   Diagnosis Date    MS (multiple sclerosis) ([address]    History reviewed. No pertinent surgical history.     Expanded or extensive additional review of patient history:     Home Situation  Home Environment: Private residence  # Steps to Enter: 1  Rails to Enter: No  One/Two Story Residence: Split level  # of Interior Steps: 2  Interior Rails: None  Living Alone: No  Support Systems: Spouse/Significant Other  Patient Expects to be Discharged to[de-identified] Rehab hospital/unit acute  Current DME Used/Available at Home: [employer], straight, Wheelchair, power, Wheelchair, Shower chair  Tub or Shower Type: Shower    Hand dominance: Right    EXAMINATION OF PERFORMANCE DEFICITS:  Cognitive/Behavioral Status:  Neurologic State: Alert  Orientation Level: Oriented X4  Cognition: Appropriate decision making; Appropriate for age attention/concentration; Appropriate safety awareness  Perception: Appears intact  Perseveration: No perseveration noted  Safety/Judgement: Good awareness of safety precautions; Home safety; Insight into deficits    Skin: dry, intact    Edema: none noted     Hearing: Auditory  Auditory Impairment: None    Vision/Perceptual:       Corrective Lenses: Glasses    Range of Motion:    AROM: Generally decreased, functional     Strength:    Strength: Generally decreased, functional     Coordination:  Coordination: Within functional limits  Fine Motor Skills-Upper: Left Impaired;Right Intact    Gross Motor Skills-Upper: Left Impaired;Right Intact    Tone & Sensation:    Tone: Abnormal (L side affected by MS)  Sensation: Intact     Balance:  Sitting: Intact  Standing: Impaired  Standing - Static: Constant support;Good  Standing - Dynamic : Constant support;Good    Functional Mobility and Transfers for ADLs:  Bed Mobility:  Sit to Supine: Minimum assistance;Assist x1;Additional time; Adaptive equipment  Scooting: Supervision    Transfers:  Sit to Stand: Minimum assistance;Assist x1  Stand to Sit: Minimum assistance;Assist x1  Bathroom Mobility: Contact guard assistance;Minimum assistance  Toilet Transfer : Contact guard assistance  Assistive Device : Gait Belt;Walker, rolling    ADL Assessment:  Feeding: Independent;Setup    Oral Facial Hygiene/Grooming: Independent;Setup    Bathing: Moderate assistance         Upper Body Dressing: Setup; Independent    Lower Body Dressing: Maximum assistance    Toileting: Minimum assistance        ADL Intervention and task modifications:          Cognitive Retraining  Safety/Judgement: Good awareness of safety precautions; Home safety; Insight into deficits       Functional Measure:    Barthel Index:  Bathin  Bladder: 10  Bowels: 10  Groomin  Dressin  Feeding: 10  Mobility: 10  Stairs: 5  Toilet Use: 5  Transfer (Bed to Chair and Back): 10  Total: 70/100      The Barthel ADL Index: Guidelines  1. The index should be used as a record of what a patient does, not as a record of what a patient could do. 2. The main aim is to establish degree of independence from any help, physical or verbal, however minor and for whatever reason. 3. The need for supervision renders the patient not independent. 4. A patient's performance should be established using the best available evidence. Asking the patient, friends/relatives and nurses are the usual sources, but direct observation and common sense are also important. However direct testing is not needed. 5. Usually the patient's performance over the preceding 24-48 hours is important, but occasionally longer periods will be relevant. 6. Middle categories imply that the patient supplies over 50 per cent of the effort. 7. Use of aids to be independent is allowed. Score Interpretation (from [facility])    Independent   60-79 Minimally independent   40-59 Partially dependent   20-39 Very dependent   <20 Totally dependent     -Serge Dyer., Barthel, D.W. (1965). Functional evaluation: the Barthel Index. [facility] ([address] [place] (1997). The Barthel activities of daily living index: self-reporting versus actual performance in the old (> or = 75 years). Journal of [address] [place] [address] 45(7), [address] [facility], [place], Joaquina Carrion., [place] (1999).  Measuring the change in disability after inpatient rehabilitation; comparison of the responsiveness of the Barthel Index and Functional [place] Measure. Journal of Neurology, Neurosurgery, and Psychiatry, 66(4), 144-053. SANDRO Mcmillan, MINA Miranda, & Anny Hawk M.A. (2004) Assessment of post-stroke quality of life in cost-effectiveness studies: The usefulness of the Barthel Index and the EuroQoL-5D. Quality of Life Research, 15, 211-57     Occupational Therapy Evaluation Charge Determination   History Examination Decision-Making   LOW Complexity : Brief history review  LOW Complexity : 1-3 performance deficits relating to physical, cognitive , or psychosocial skils that result in activity limitations and / or participation restrictions  LOW Complexity : No comorbidities that affect functional and no verbal or physical assistance needed to complete eval tasks       Based on the above components, the patient evaluation is determined to be of the following complexity level: LOW   Pain Ratin/10 at rest    Activity Tolerance:   Good    After treatment patient left in no apparent distress:    Sitting in chair, Call bell within reach, and Caregiver / family present    COMMUNICATION/EDUCATION:   The patients plan of care was discussed with: Physical therapist and Registered nurse. Home safety education was provided and the patient/caregiver indicated understanding. This patients plan of care is appropriate for delegation to [facility].     Thank you for this referral.  Aicha Blanco OT  Time Calculation: 45 mins

## 2022-07-23 NOTE — PROGRESS NOTES
Transition of Care    RUR: 6%    [facility] accepted Mrs. Arroyo. Ms. Kinza Sánchez was informed. The home health information was placed on the Follow-up. Ms. Kinza Sánchez was seen. She was informed that [facility] accepted her. She requested a referral to an acute rehab. She was given the choice list.  She chose [facility]. A referral was sent through [address] [place] [address]. Will continue to follow for discharge planning.   Signed By: Vivien Riggins LCSW     July 23, 2022

## 2022-07-23 NOTE — PROGRESS NOTES
Orthopedic Progress Note    S: Pain well controlled, no new complaints; Denies numbness, tingling, new focal weakness, cp, sob, fever, chills    O: NAD, respirations unlabored; Dressings CDI; thigh soft, no edema, no posterior calf ttp; EHL 3/5 DF 5/5, PF 1/5, SILT; Cap refill brisk, foot warm, DP2+    Patient Vitals for the past 4 hrs:   Temp Pulse BP   07/22/22 2024 98.3 °F (36.8 °C) 78 111/71   07/22/22 1909 98.7 °F (37.1 °C) 71 111/73   07/22/22 1815 98.1 °F (36.7 °C) 76 107/71   07/22/22 1800 -- 82 100/69     Recent Labs     07/21/22  2352   HGB 12.6   HCT 37.7      BUN 14   CREA 0.72   K 3.9               A/P:  Procedure: Procedure(s):  LEFT PERCUTANEOUS HIP PINNING  Post Op day: 2   - DVT ppx - ASA 81mg BID x 1 month; SCDs  - PT/OT - 50% WB LLE  - Pain - continue current regimen; Ice, Elevation, Ace wrap as needed  - Dressing - Leave in place if it remains dry and intact; change as needed for saturation with dry sterile island dressing  - Dispo - Awaiting Rehab; needs f/u in 2-3 weeks with Dr. Gail Hancock; refer to DC instructions for further details.     Abbie Ball PA  Orthopedic Trauma Service  [address] [place] [address]

## 2022-07-23 NOTE — PROGRESS NOTES
Primary Nurse Luda Gallegos RN and Jeff Corey LPN performed a dual skin assessment on this patient No impairment noted  Troy score is 20

## 2022-07-23 NOTE — PROGRESS NOTES
Problem: Falls - Risk of  Goal: *Absence of Falls  Description: Document Leanne Ogden Fall Risk and appropriate interventions in the flowsheet.   Outcome: Progressing Towards Goal  Note: Fall Risk Interventions:  Mobility Interventions: Bed/chair exit alarm, Communicate number of staff needed for ambulation/transfer, Patient to call before getting OOB         Medication Interventions: Assess postural VS orthostatic hypotension, Evaluate medications/consider consulting pharmacy, Bed/chair exit alarm    Elimination Interventions: Bed/chair exit alarm, Call light in reach, Patient to call for help with toileting needs    History of Falls Interventions: Bed/chair exit alarm, Evaluate medications/consider consulting pharmacy, Investigate reason for fall         Problem: Patient Education: Go to Patient Education Activity  Goal: Patient/Family Education  Outcome: Progressing Towards Goal     Problem: Patient Education: Go to Patient Education Activity  Goal: Patient/Family Education  Outcome: Progressing Towards Goal     Problem: Hip Replacement: Day of Surgery/Unit  Goal: Off Pathway (Use only if patient is Off Pathway)  Outcome: Progressing Towards Goal  Goal: Activity/Safety  Outcome: Progressing Towards Goal  Note: Patient educated to not get out of bed alone, patient to call for help before getting out of bed, call bell left in patient's hand  Goal: Consults, if ordered  Outcome: Progressing Towards Goal  Goal: Diagnostic Test/Procedures  Outcome: Progressing Towards Goal  Goal: Nutrition/Diet  Outcome: Progressing Towards Goal  Goal: Medications  Outcome: Progressing Towards Goal  Goal: Respiratory  Outcome: Progressing Towards Goal  Note: Patient educated on how to use incentive spirometer and to use it ten times per hour  Goal: Treatments/Interventions/Procedures  Outcome: Progressing Towards Goal  Goal: Psychosocial  Outcome: Progressing Towards Goal  Goal: *Initiate mobility  Outcome: Progressing Towards Goal  Goal: *Optimal pain control at patient's stated goal  Outcome: Progressing Towards Goal  Goal: *Hemodynamically stable  Outcome: Progressing Towards Goal

## 2022-07-23 NOTE — PROGRESS NOTES
[facility]  Hospitalist Group                                                                                          Hospitalist Progress Note  Ricardo Carrion NP  Answering service: [phone] from in house phone        Date of Service:  2022  NAME:  Leonor Rubinstein  :  1965  MRN:  [id number]      Admission Summary:   Per H&P, Leonor Rubinstein is a 61 y.o. female with past medical history of multiple sclerosis presented to the ED with chief complaint of left hip pain after fall. Patient reportedly had a ground level fall, after tripping and landing on hard tile floor in her bedroom at about 10 p.m. tonight. She complains of left hip pain, sudden after fall, moderate to severe, aggravated with movement, weight bearing, or touch. She denies any head/ neck trauma or loss of consciousness. She notes having chronic left foot drop with left sided weakness secondary to MS. She notes that she takes Baclofen for the same. On arrival in the ED, workup included left femur xray showing acute left femoral neck fractures. ED then requested admission to the hospitalist service. Per report from ED MD, orthopedic surgery service are aware of consultation       Interval history / Subjective:     Patient seen on morning rounds. Pain currently controlled. No acute events overnight     Assessment & Plan:        Left femoral neck fracture  S/p ground-level fall  Orthopedics following, appreciate recommendations  Postop day 1 percutaneous pin  Continuing multimodal pain control  Working with physical therapy    MS  I reviewed medications with the patient  Continue baclofen    Bradycardia  No further bradycardia noted       Code status: DNR  Prophylaxis: ASA  Care Plan discussed with: Patient, family, nurse, care manager,   Anticipated Disposition: With physical therapy, stable for discharge once cleared by PT.      Hospital Problems  Date Reviewed: 2017 Codes Class Noted POA    Multiple sclerosis ([facility] ICD-10-CM: G35  ICD-9-CM: 129  7/22/2022 Unknown        Closed fracture of neck of left femur ([facility] ICD-10-CM: K30.030K  ICD-9-CM: 820.8  7/22/2022 Unknown        Acute hip pain, left ICD-10-CM: M25.552  ICD-9-CM: 719.45  7/22/2022 Unknown        Fall from ground level ICD-10-CM: X32.38KR  ICD-9-CM: E888.9  7/22/2022 Unknown           Review of Systems:   A comprehensive review of systems was negative except for that written in the HPI. Vital Signs:    Last 24hrs VS reviewed since prior progress note. Most recent are:  Visit Vitals  /66 (BP 1 Location: Right upper arm, BP Patient Position: Sitting)   Pulse (!) 109   Temp 98.9 °F (37.2 °C)   Resp 16   Ht 5' 7\" (1.702 m)   Wt 57 kg (125 lb 10.6 oz)   SpO2 96%   BMI 19.68 kg/m²         Intake/Output Summary (Last 24 hours) at 7/23/2022 1511  Last data filed at 7/23/2022 5121  Gross per 24 hour   Intake 1060 ml   Output 295 ml   Net 765 ml          Physical Examination:     I had a face to face encounter with this patient and independently examined them on 7/23/2022 as outlined below:          Constitutional:  No acute distress, cooperative, pleasant    ENT:  Oral mucosa moist, oropharynx benign. Resp:  CTA bilaterally. No wheezing/rhonchi/rales. No accessory muscle use. CV:  Regular rhythm, normal rate, no murmurs, gallops, rubs    GI:  Soft, non distended, non tender. normoactive bowel sounds, no hepatosplenomegaly     Musculoskeletal:  No edema, warm, 2+ pulses throughout    Neurologic:  Moves all extremities.   AAOx3, CN II-XII reviewed            Data Review:    Review and/or order of clinical lab test  Review and/or order of tests in the radiology section of CPT  I personally reviewed  Image      Labs:     Recent Labs     07/23/22  0512 07/21/22  2352   WBC 6.0 7.7   HGB 11.5 12.6   HCT 34.4* 37.7    234       Recent Labs     07/23/22  0512 07/21/22  2352    141   K 3.6 3.9   * 107   CO2 27 30   BUN 8 14   CREA 0.55 0.72   GLU 83 99   CA 8.6 9.3   MG 2.0  --        Recent Labs     07/21/22  2352   ALT 22   AP 64   TBILI 0.2   TP 7.2   ALB 3.9   GLOB 3.3       Recent Labs     07/22/22  1325   INR 1.0   PTP 10.8   APTT 27.2        No results for input(s): FE, TIBC, PSAT, FERR in the last 72 hours. No results found for: FOL, RBCF   No results for input(s): PH, PCO2, PO2 in the last 72 hours. No results for input(s): CPK, CKNDX, TROIQ in the last 72 hours.     No lab exists for component: CPKMB  No results found for: CHOL, CHOLX, CHLST, CHOLV, HDL, HDLP, LDL, LDLC, DLDLP, TGLX, TRIGL, TRIGP, CHHD, CHHDX  No results found for: GLUCPOC  No results found for: COLOR, APPRN, SPGRU, REFSG, ALLIE, PROTU, GLUCU, KETU, BILU, UROU, MEJIA, LEUKU, GLUKE, EPSU, BACTU, WBCU, RBCU, CASTS, UCRY      Medications Reviewed:     Current Facility-Administered Medications   Medication Dose Route Frequency    sodium chloride (NS) flush 5-40 mL  5-40 mL IntraVENous Q8H    sodium chloride (NS) flush 5-40 mL  5-40 mL IntraVENous PRN    acetaminophen (TYLENOL) tablet 650 mg  650 mg Oral Q6H PRN    Or    acetaminophen (TYLENOL) suppository 650 mg  650 mg Rectal Q6H PRN    polyethylene glycol (MIRALAX) packet 17 g  17 g Oral DAILY PRN    ondansetron (ZOFRAN ODT) tablet 4 mg  4 mg Oral Q8H PRN    Or    ondansetron (ZOFRAN) injection 4 mg  4 mg IntraVENous Q6H PRN    morphine injection 1 mg  1 mg IntraVENous Q4H PRN    baclofen (LIORESAL) tablet 10 mg  10 mg Oral QID    FLUoxetine (PROzac) capsule 10 mg  10 mg Oral DAILY    HYDROmorphone (DILAUDID) injection 0.5 mg  0.5 mg IntraVENous Q4H PRN    lidocaine 4 % patch 1 Patch  1 Patch TransDERmal Q24H    oxyCODONE IR (ROXICODONE) tablet 5 mg  5 mg Oral Q4H PRN    traMADoL (ULTRAM) tablet 50 mg  50 mg Oral Q6H PRN    hydrOXYzine HCL (ATARAX) tablet 10 mg  10 mg Oral Q8H PRN    famotidine (PEPCID) tablet 20 mg  20 mg Oral BID    senna-docusate (PERICOLACE) 8.6-50 mg per tablet 1 Tablet  1 Tablet Oral BID    polyethylene glycol (MIRALAX) packet 17 g  17 g Oral DAILY    [START ON 7/24/2022] bisacodyL (DULCOLAX) suppository 10 mg  10 mg Rectal DAILY PRN    aspirin delayed-release tablet 81 mg  81 mg Oral BID     ______________________________________________________________________  EXPECTED LENGTH OF STAY: - - -  ACTUAL LENGTH OF STAY:          1                 Brianna Hubbard NP

## 2022-07-23 NOTE — PROGRESS NOTES
Bedside and Verbal shift change report given to ROZ Ng (oncoming nurse) by Gennette Landau, RN (offgoing nurse). Report included the following information SBAR and MAR.

## 2022-07-23 NOTE — PROGRESS NOTES
Problem: Falls - Risk of  Goal: *Absence of Falls  Description: Document Dale Krishnamurthy Fall Risk and appropriate interventions in the flowsheet. 7/23/2022 1023 by Raul Maldonado RN  Outcome: Progressing Towards Goal  Note: Fall Risk Interventions:  Mobility Interventions: Bed/chair exit alarm, Communicate number of staff needed for ambulation/transfer, Patient to call before getting OOB         Medication Interventions: Assess postural VS orthostatic hypotension, Evaluate medications/consider consulting pharmacy, Bed/chair exit alarm    Elimination Interventions: Call light in reach, Patient to call for help with toileting needs, Bed/chair exit alarm    History of Falls Interventions: Bed/chair exit alarm, Door open when patient unattended, Investigate reason for fall      7/22/2022 2036 by Raul Maldonado RN  Outcome: Progressing Towards Goal  Note: Fall Risk Interventions:  Mobility Interventions: Bed/chair exit alarm, Communicate number of staff needed for ambulation/transfer, Patient to call before getting OOB         Medication Interventions: Assess postural VS orthostatic hypotension, Evaluate medications/consider consulting pharmacy, Bed/chair exit alarm    Elimination Interventions: Bed/chair exit alarm, Call light in reach, Patient to call for help with toileting needs    History of Falls Interventions: Bed/chair exit alarm, Evaluate medications/consider consulting pharmacy, Investigate reason for fall         Problem: Patient Education: Go to Patient Education Activity  Goal: Patient/Family Education  7/23/2022 1023 by Raul Maldonado RN  Outcome: Progressing Towards Goal  7/22/2022 2036 by Raul Maldonado RN  Outcome: Progressing Towards Goal     Problem: Patient Education: Go to Patient Education Activity  Goal: Patient/Family Education  7/23/2022 1023 by Raul Maldonado RN  Outcome: Progressing Towards Goal  7/22/2022 2036 by Raul Maldonado RN  Outcome: Progressing Towards Goal     Problem:  Hip Replacement: Day of Surgery/Unit  Goal: Off Pathway (Use only if patient is Off Pathway)  7/23/2022 1023 by Nikole Tong RN  Outcome: Progressing Towards Goal  7/22/2022 2036 by Nikole Tong RN  Outcome: Progressing Towards Goal  Goal: Activity/Safety  7/23/2022 1023 by Nikole Tong RN  Outcome: Progressing Towards Goal  7/22/2022 2036 by Nikole Tong RN  Outcome: Progressing Towards Goal  Note: Patient educated to not get out of bed alone, patient to call for help before getting out of bed, call bell left in patient's hand  Goal: Consults, if ordered  7/23/2022 1023 by Nikole Tong RN  Outcome: Progressing Towards Goal  7/22/2022 2036 by Nikole Tong RN  Outcome: Progressing Towards Goal  Goal: Diagnostic Test/Procedures  7/23/2022 1023 by Nikole Tong RN  Outcome: Progressing Towards Goal  7/22/2022 2036 by Nikole Tong RN  Outcome: Progressing Towards Goal  Goal: Nutrition/Diet  7/23/2022 1023 by Nikole Tong RN  Outcome: Progressing Towards Goal  7/22/2022 2036 by Nikole Tong RN  Outcome: Progressing Towards Goal  Goal: Medications  7/23/2022 1023 by Nikole Tong RN  Outcome: Progressing Towards Goal  7/22/2022 2036 by Nikole Tong RN  Outcome: Progressing Towards Goal  Goal: Respiratory  7/23/2022 1023 by Nikole Tong RN  Outcome: Progressing Towards Goal  7/22/2022 2036 by Nikole Tong RN  Outcome: Progressing Towards Goal  Note: Patient educated on how to use incentive spirometer and to use it ten times per hour  Goal: Treatments/Interventions/Procedures  7/23/2022 1023 by Nikole Tong RN  Outcome: Progressing Towards Goal  7/22/2022 2036 by Nikole Tong RN  Outcome: Progressing Towards Goal  Goal: Psychosocial  7/23/2022 1023 by Nikole Tong RN  Outcome: Progressing Towards Goal  7/22/2022 2036 by Nikole Tong RN  Outcome: Progressing Towards Goal  Goal: *Initiate mobility  7/23/2022 1023 by Nikole Tong RN  Outcome: Progressing Towards Goal  7/22/2022 2036 by Sandeep Clay RN  Outcome: Progressing Towards Goal  Goal: *Optimal pain control at patient's stated goal  7/23/2022 1023 by Sandeep Clay RN  Outcome: Progressing Towards Goal  7/22/2022 2036 by Sandeep Clay RN  Outcome: Progressing Towards Goal  Goal: *Hemodynamically stable  7/23/2022 1023 by Sandeep Clay RN  Outcome: Progressing Towards Goal  7/22/2022 2036 by Sandeep Clay RN  Outcome: Progressing Towards Goal     Problem: Hip Replacement: Post Op Day 1  Goal: Off Pathway (Use only if patient is Off Pathway)  7/23/2022 1023 by Sandeep Clay RN  Outcome: Progressing Towards Goal  7/22/2022 2036 by Sandeep Clay RN  Outcome: Progressing Towards Goal  Goal: Activity/Safety  7/23/2022 1023 by Sandeep Clay RN  Outcome: Progressing Towards Goal  7/22/2022 2036 by Sandeep Clay RN  Outcome: Progressing Towards Goal  Goal: Diagnostic Test/Procedures  7/23/2022 1023 by Sandeep Clay RN  Outcome: Progressing Towards Goal  7/22/2022 2036 by Sandeep Clay RN  Outcome: Progressing Towards Goal  Goal: Nutrition/Diet  7/23/2022 1023 by Sandeep Clay RN  Outcome: Progressing Towards Goal  7/22/2022 2036 by Sandeep Clay RN  Outcome: Progressing Towards Goal  Goal: Medications  7/23/2022 1023 by Sandeep Clay RN  Outcome: Progressing Towards Goal  7/22/2022 2036 by Sandeep Clay RN  Outcome: Progressing Towards Goal  Goal: Respiratory  7/23/2022 1023 by Sandeep Clay RN  Outcome: Progressing Towards Goal  7/22/2022 2036 by Sandeep Clay RN  Outcome: Progressing Towards Goal  Goal: Treatments/Interventions/Procedures  7/23/2022 1023 by Sandeep Clay RN  Outcome: Progressing Towards Goal  7/22/2022 2036 by Sandeep Clay RN  Outcome: Progressing Towards Goal  Goal: Psychosocial  7/23/2022 1023 by Sandeep Clay RN  Outcome: Progressing Towards Goal  7/22/2022 2036 by Sandeep Clay RN  Outcome: Progressing Towards Goal  Goal: Discharge Planning  7/23/2022 1023 by Elena Adams RN  Outcome: Progressing Towards Goal  7/22/2022 2036 by Elena Adams RN  Outcome: Progressing Towards Goal  Goal: *Demonstrates progressive activity  7/23/2022 1023 by Elena Adams RN  Outcome: Progressing Towards Goal  7/22/2022 2036 by Elena Adams RN  Outcome: Progressing Towards Goal  Goal: *Optimal pain control at patient's stated goal  7/23/2022 1023 by Elena Adams RN  Outcome: Progressing Towards Goal  7/22/2022 2036 by Elena Adams RN  Outcome: Progressing Towards Goal  Goal: *Hemodynamically stable  7/23/2022 1023 by Elena Adams RN  Outcome: Progressing Towards Goal  7/22/2022 2036 by Elena Adams RN  Outcome: Progressing Towards Goal  Goal: *Discharge plan identified  7/23/2022 1023 by Elena Adams RN  Outcome: Progressing Towards Goal  7/22/2022 2036 by Elena Adams RN  Outcome: Progressing Towards Goal

## 2022-07-23 NOTE — PROGRESS NOTES
Paged on call Provider for Ortho, patient doesn't have 2 doses of post op ancef 2gm orders.      Spoke with PA, want patient to have the 2 doses, orders entered

## 2022-07-23 NOTE — DISCHARGE INSTRUCTIONS
Discharge Instructions Hip Pinning  Dr. Carly Lara    Patient Name  Dariana Isaac  Date of procedure  7/22/2022    Procedure  Procedure(s):  LEFT PERCUTANEOUS HIP PINNING  Surgeon  Surgeon(s) and Role:     * Sabiha Lynch MD - Primary  Date of discharge: [unfilled]  PCP: @PCP@    Follow up care  Follow up visit with Dr. Carly Lara in 3-4 weeks. Call [phone] Julia Bonner to make an appointment. If Home Health has been arranged for you, they will call you to arrange dates/times for visits. Call them if you do not hear from them within 24 hours after you go home. Activity at home  50% weight bearing on left lower extremity   Bathing and caring for your incision  You may take a shower with your waterproof dressing on your hip. The waterproof dressing is to stay on your hip for 7 days. On the 7th day have someone gently peel the dressing off by lifting the edge and stretching it to break the seal.  You may then leave your incision open to air unless you see drainage from your hip. Preventing blood clots  Take Aspirin 81mg twice each day for one month (30 days) following surgery  Call Dr. Carly Lara for signs of a blood clot in your leg: calf pain, tenderness, redness, swelling of lower leg   Preventing lung congestion  Use your incentive spirometer 4 times a day; do 10 repetitions each time  Remember to keep the small blue ball between the two arrows when taking a slow, deep breath   Pain Management  Get up and walk a short distance to relieve pain and stiffness. 50% Weight bearing. Place ice wrap on your hip except when you are walking. The gel ice packs should be changed about every 4 hours. Elevate your leg on pillows for 15-30 minutes. Diet after surgery  You may resume your normal diet. Include vegetables, fruit, whole grains, lean meats, and low-fat dairy products. Eat food high in fiber.    Drink plenty of fluids, including 8 cups of water daily Take a stool softener (Senokot-s or Colace) to prevent constipation. If constipation occurs you may take a laxative (Milk of Magnesia, Dulcolax tablets). Avoid after surgery  Do not take any over-the-counter medication for pain except Tylenol  Do not take more than 3000mg (3 Grams) of Tylenol in 24 hours  Do not drink alcoholic beverages  Do not smoke  Do not drive until off of all pain medications and you feel comfortable with driving   Do not place frozen gel pack directly on your skin. It can cause frostbite. Do not take a tub bath, swim or get in a hot tub for 8 weeks  Prevention of falls and safety at home  Set up an area where you can rest comfortably leaving space around furniture to allow you to walk with your walker. Keep stairs, hallways and bathrooms well lit; especially at night. Arrange for care for your pets. Keep your home free of clutter. Call Dr. Aldair Valladares at [phone] for:  Pain that is not relieved by pain medication, ice and activity  Side effects of medications  Increased/spread of bruising  Warning signs of infection:  persistent fever greater than 100 degrees  shaking or chills  increased redness, tenderness, swelling or drainage from incision  increased pain during activity or rest  Warning signs of a blood clot in your leg:  increased pain in your calf  tenderness or redness  increased swelling or knee, calf, ankle or foot    Call [phone] after 5pm or on a weekend.  The on call physician will return your phone call  Call your Primary Care Doctor for:   Concerns about your medical conditions such as diabetes, high blood pressure, asthma, congestive heart failure  Blood sugars greater than 180  Persistent headache or dizziness  Coughing or congestion  Constipation or diarrhea  Burning when you go to the bathroom  Abnormal heart rate (fast or slow)      Call 911 and go to the nearest hospital for:   Sudden increased shortness of breath  Sudden onset of chest pain  Difficulty breathing  Localized chest pain with coughing or taking a deep breath                      Discharge SNF/Rehab Instructions/LTAC       PATIENT ID: Jacobo Covington  MRN: [id number]   YOB: 1965    DATE OF ADMISSION: 7/22/22  DATE OF DISCHARGE: 7/26/2022    PRIMARY CARE PROVIDER:  Sejal Greenfield MD      ATTENDING PHYSICIAN: Carlos A Paul MD  DISCHARGING PROVIDER: Ladarius Soria NP     To contact this individual call [phone] and ask the  to page. If unavailable ask to be transferred the Adult Hospitalist Department. CONSULTATIONS: Ortho    PROCEDURES/SURGERIES: Procedure(s):  LEFT PERCUTANEOUS HIP PINNING    ADMITTING [address] [place] [address] COURSE:   Per H&P, Jacobo Covington is a 61 y.o. female with past medical history of multiple sclerosis presented to the ED with chief complaint of left hip pain after fall. Patient reportedly had a ground level fall, after tripping and landing on hard tile floor in her bedroom at about 10 p.m. tonight. She complains of left hip pain, sudden after fall, moderate to severe, aggravated with movement, weight bearing, or touch. She denies any head/ neck trauma or loss of consciousness. She notes having chronic left foot drop with left sided weakness secondary to MS. She notes that she takes Baclofen for the same. On arrival in the ED, workup included left femur xray showing acute left femoral neck fractures. ED then requested admission to the hospitalist service.   Per report from ED MD, orthopedic surgery service are aware of consultation        DISCHARGE DIAGNOSES / PLAN:      Left femoral neck fracture  S/p ground-level fall  Orthopedics following, appreciate recommendations  Postop day 4 percutaneous pin  Continuing multimodal pain control  Working with physical therapy recommend IPR     MS  I reviewed medications with the patient  Continue baclofen            Code status: DNR  Prophylaxis: ASA    Discharged to [facility]       PENDING TEST RESULTS:   At the time of discharge the following test results are still pending:     FOLLOW UP APPOINTMENTS:    Follow-up Information       Follow up With Specialties Details Why Adam Victoria Call in 1 day(s) Please call  [facility]    Jayde Orozco MD Family Medicine   [address] [place] [address] Deontejackie Banks MD Orthopedic Surgery Follow up in 2 week(s)  [phone]               ADDITIONAL CARE RECOMMENDATIONS:     DIET: Regular Diet      TUBE FEEDING INSTRUCTIONS: na    OXYGEN / BiPAP SETTINGS: na    ACTIVITY: Activity as tolerated    WOUND CARE: na    EQUIPMENT needed: TBD      DISCHARGE MEDICATIONS:   See Medication Reconciliation Form      NOTIFY YOUR PHYSICIAN FOR ANY OF THE FOLLOWING:   Fever over 101 degrees for 24 hours. Chest pain, shortness of breath, fever, chills, nausea, vomiting, diarrhea, change in mentation, falling, weakness, bleeding. Severe pain or pain not relieved by medications. Or, any other signs or symptoms that you may have questions about.     DISPOSITION:    Home With:   OT  PT  HH  RN      x Inpatient Rehab    Independent/assisted living    Hospice    Other:       PATIENT CONDITION AT DISCHARGE:     Functional status    Poor    x Deconditioned     Independent      Cognition    x Lucid     Forgetful     Dementia      Catheters/lines (plus indication)    Little     PICC     PEG    x None      Code status     Full code    x DNR      PHYSICAL EXAMINATION AT DISCHARGE:   Refer to Progress Note      CHRONIC MEDICAL DIAGNOSES:  MS              Signed:   Kristen Carrion NP  7/26/2022  11:34 AM

## 2022-07-23 NOTE — PROGRESS NOTES
Resting comfortably. GEN:  NAD.  AOx3   ABD:  S/NT/ND   LLE:  Dressing C/D/I , Calf nttp (Bilat), Sensation grossly intact to light touch throughout, foot perfused    Patient Vitals for the past 24 hrs:   Temp Pulse Resp BP SpO2   07/23/22 0253 98.7 °F (37.1 °C) 79 18 109/68 96 %   07/22/22 2206 98.2 °F (36.8 °C) 79 16 104/68 95 %   07/22/22 2024 98.3 °F (36.8 °C) 78 16 111/71 95 %   07/22/22 1909 98.7 °F (37.1 °C) 71 14 111/73 93 %   07/22/22 1815 98.1 °F (36.7 °C) 76 14 107/71 94 %   07/22/22 1800 -- 82 12 100/69 93 %   07/22/22 1745 -- 90 12 99/65 94 %   07/22/22 1730 -- 82 10 98/63 95 %   07/22/22 1715 98.9 °F (37.2 °C) 80 13 104/63 98 %   07/22/22 1702 -- 80 14 (!) 87/55 97 %   07/22/22 1655 -- 81 14 (!) 91/57 96 %   07/22/22 1643 99.2 °F (37.3 °C) 100 12 95/61 96 %   07/22/22 1638 -- -- -- (!) 85/69 --   07/22/22 1432 98.5 °F (36.9 °C) 85 16 119/76 94 %   07/22/22 0935 97.9 °F (36.6 °C) 85 18 119/77 97 %       Current Facility-Administered Medications   Medication Dose Route Frequency    sodium chloride (NS) flush 5-40 mL  5-40 mL IntraVENous Q8H    sodium chloride (NS) flush 5-40 mL  5-40 mL IntraVENous PRN    acetaminophen (TYLENOL) tablet 650 mg  650 mg Oral Q6H PRN    Or    acetaminophen (TYLENOL) suppository 650 mg  650 mg Rectal Q6H PRN    polyethylene glycol (MIRALAX) packet 17 g  17 g Oral DAILY PRN    ondansetron (ZOFRAN ODT) tablet 4 mg  4 mg Oral Q8H PRN    Or    ondansetron (ZOFRAN) injection 4 mg  4 mg IntraVENous Q6H PRN    morphine injection 1 mg  1 mg IntraVENous Q4H PRN    baclofen (LIORESAL) tablet 10 mg  10 mg Oral QID    FLUoxetine (PROzac) capsule 10 mg  10 mg Oral DAILY    HYDROmorphone (DILAUDID) injection 0.5 mg  0.5 mg IntraVENous Q4H PRN    lidocaine 4 % patch 1 Patch  1 Patch TransDERmal Q24H    oxyCODONE IR (ROXICODONE) tablet 5 mg  5 mg Oral Q4H PRN    traMADoL (ULTRAM) tablet 50 mg  50 mg Oral Q6H PRN    lactated Ringers infusion 1,000 mL  1,000 mL IntraVENous CONTINUOUS 0.9% sodium chloride infusion  25 mL/hr IntraVENous CONTINUOUS    lidocaine (PF) (XYLOCAINE) 10 mg/mL (1 %) injection 0.1 mL  0.1 mL SubCUTAneous PRN    fentaNYL citrate (PF) injection 50 mcg  50 mcg IntraVENous PRN    midazolam (VERSED) injection 1 mg  1 mg IntraVENous PRN    midazolam (VERSED) injection 1 mg  1 mg IntraVENous PRN    glycopyrrolate (ROBINUL) injection 0.2 mg  0.2 mg IntraVENous ONCE PRN    ropivacaine (PF) (NAROPIN) 5 mg/mL (0.5 %) injection 30 mL  30 mL Peripheral Nerve Block PRN    hydrOXYzine HCL (ATARAX) tablet 10 mg  10 mg Oral Q8H PRN    famotidine (PEPCID) tablet 20 mg  20 mg Oral BID    senna-docusate (PERICOLACE) 8.6-50 mg per tablet 1 Tablet  1 Tablet Oral BID    polyethylene glycol (MIRALAX) packet 17 g  17 g Oral DAILY    [START ON 2022] bisacodyL (DULCOLAX) suppository 10 mg  10 mg Rectal DAILY PRN    aspirin delayed-release tablet 81 mg  81 mg Oral BID       Lab Results   Component Value Date/Time    HGB 11.5 2022 05:12 AM    INR 1.0 2022 01:25 PM       Lab Results   Component Value Date/Time    Sodium 141 2022 05:12 AM    Potassium 3.6 2022 05:12 AM    Chloride 109 (H) 2022 05:12 AM    CO2 27 2022 05:12 AM    BUN 8 2022 05:12 AM    Creatinine 0.55 2022 05:12 AM    Calcium 8.6 2022 05:12 AM    Magnesium 2.0 2022 05:12 AM            61 y.o. female s/p percutaneous pinning left hip on 2022  . Doing well.      Precautions: None  ABX: Complete 24 hours perioperative abx  PATHWAY: Straight cath per protocol  DVT Prophylaxis: ASA 81 mg enteric coated BID  Weight Bearin% WB LLE   Disposition: cleared from ortho standpoint once cleared by PT, home with HHPT

## 2022-07-23 NOTE — PROGRESS NOTES
Problem: Mobility Impaired (Adult and Pediatric)  Goal: *Acute Goals and Plan of Care (Insert Text)  Description: FUNCTIONAL STATUS PRIOR TO ADMISSION: Patient was modified independent using a single point cane within her home and short distance within the community and used a power wheelchair for longer distances within the community for functional mobility. Pt reports having a fall within the last year. HOME SUPPORT PRIOR TO ADMISSION: The patient lived with her spouse but did not require assist.    Physical Therapy Goals  Initiated 7/23/2022  1. Patient will move from supine to sit and sit to supine  in bed with independence within 7 day(s). 2.  Patient will transfer from bed to chair and chair to bed with modified independence using the least restrictive device within 7 day(s). 3.  Patient will perform sit to stand with modified independence within 7 day(s). 4.  Patient will ambulate with modified independence for 150 feet with the least restrictive device within 7 day(s). 5.  Patient will ascend/descend 1 step  with modified independence within 7 day(s). Outcome: Progressing Towards Goal   PHYSICAL THERAPY EVALUATION  Patient: Leonor Rubinstein (85 y.o. female)  Date: 7/23/2022  Primary Diagnosis: Closed fracture of neck of left femur (HCC) [S72.002A]  Acute hip pain, left [M25.552]  Fall from ground level [W18.30XA]  Multiple sclerosis (HCC) [G35]  Procedure(s) (LRB):  LEFT PERCUTANEOUS HIP PINNING (Left) 1 Day Post-Op   Precautions:   Fall, PWB (50% WB LLE)      ASSESSMENT  Based on the objective data described below, the patient has a history of MS with L sided weakness and wears an AFO for ambulation due to L foot drop. Pt presented with minimal L hip pain, decreased generalized strength and LUE/LLE weakness, decreased standing balance, and impaired functional mobility below her baseline S/P admission for left femur fracture and S/P LEFT PERCUTANEOUS HIP PINNING POD 1.  Pt required minimal assistance with bed mobility mostly for moving LLE off EOB and transfers to stand with walker. Pt tolerated ambulation for short distances with a rolling walker while adhering to 50% WB LLE. Pt ambulating less than household distances at this time and has 2 steps without rails within her home that will present a challenge. Pt's  states he can assist pt up/down these 2 steps in the wheelchair however pt will need to remain in one area of her home while he is at work or until she progresses and is able to manage 2 steps with 50% WB. Pt may benefit from short stay in IPR to maximize her independence and safe management of 2 stairs. Pt is very motivated to increase her independence and will be able to tolerate 3 hours of therapy. Current Level of Function Impacting Discharge (mobility/balance): bed mobility and transfers with  minimal assistance, ambulation with rolling walker x 70 feet with CGA, unable to safely manage stairs without rails at this time    Functional Outcome Measure: The patient scored Total Score: 15/28 on the Tinetti outcome measure which is indicative of high fall risk. Other factors to consider for discharge: PMH - MS, fall risk, will be alone during the day, below her baseline, limited with 50% WB LLE     Patient will benefit from skilled therapy intervention to address the above noted impairments. PLAN :  Recommendations and Planned Interventions: bed mobility training, transfer training, gait training, and patient and family training/education      Frequency/Duration: Patient will be followed by physical therapy:  daily to address goals.     Recommendation for discharge: (in order for the patient to meet his/her long term goals)  Therapy 3 hours per day 5-7 days per week versus  PT pending progress     This discharge recommendation:  Has been made in collaboration with the attending provider and/or case management    IF patient discharges home will need the following DME: rolling walker         SUBJECTIVE:   Patient stated I am not even putting that much weight on my leg(50%).     OBJECTIVE DATA SUMMARY:   HISTORY:    Past Medical History:   Diagnosis Date    MS (multiple sclerosis) ([facility] [address]    History reviewed. No pertinent surgical history. Personal factors and/or comorbidities impacting plan of care: history of MS, stairs to enter home and within home do not have rails     Home Situation  Home Environment: Private residence  # Steps to Enter: 1  Rails to Enter: No  One/Two Story Residence: [facility] level  # of Interior Steps: 2  Interior Rails: None  Living Alone: No  Support Systems: Spouse/Significant Other  Patient Expects to be Discharged to[de-identified] Rehab hospital/unit acute  Current DME Used/Available at Home: Cane, straight, Wheelchair, power, Wheelchair, Shower chair  Tub or Shower Type: Shower    EXAMINATION/PRESENTATION/DECISION MAKING:   Critical Behavior:  Neurologic State: Alert  Orientation Level: Oriented X4  Cognition: Appropriate decision making, Appropriate for age attention/concentration, Appropriate safety awareness  Safety/Judgement: Good awareness of safety precautions, Home safety, Insight into deficits  Hearing: Auditory  Auditory Impairment: None  Skin:  dressing intact    Range Of Motion:  AROM: Generally decreased, functional uninvolved extremities, LLE limited due to pain                        Strength:    Strength: Generally decreased, functional RUE/RLE  History of MS with chronic left sided weakness                    Tone & Sensation:   Tone: Normal              Sensation: Intact               Coordination:  Coordination: Within functional limits  Vision:   Corrective Lenses: Glasses  Functional Mobility:  Bed Mobility:        Sit to Supine: Minimum assistance;Assist x1;Additional time; Adaptive equipment  Scooting: Supervision  Transfers:  Sit to Stand: Minimum assistance;Assist x1  Stand to Sit: Minimum assistance;Assist x1 Balance:   Sitting: Intact  Standing: Impaired  Standing - Static: Constant support;Good  Standing - Dynamic : Constant support;Good  Ambulation/Gait Training:  Distance (ft): 15 feet x 1, 60 Feet (ft) x 1  Assistive Device: Walker, rolling;Gait belt;Orthotic device (AFO)  Ambulation - Level of Assistance: Contact guard assistance;Assist x1        Gait Abnormalities: Step to gait; Decreased step clearance; Foot drop     Left Side Weight Bearing: Partial (%) (50% WB)     Stance: Left decreased  Speed/Myriam: Pace decreased (<100 feet/min)  Step Length: Right shortened;Left shortened          Functional Measure:  Tinetti test:    Sitting Balance: 1  Arises: 1  Attempts to Rise: 1  Immediate Standing Balance: 1  Standing Balance: 1  Nudged: 0  Eyes Closed: 1  Turn 360 Degrees - Continuous/Discontinuous: 0  Turn 360 Degrees - Steady/Unsteady: 1  Sitting Down: 1  Balance Score: 8 Balance total score  Indication of Gait: 1  R Step Length/Height: 1  L Step Length/Height: 0  R Foot Clearance: 1  L Foot Clearance: 0  Step Symmetry: 0  Step Continuity: 1  Path: 1  Trunk: 1  Walking Time: 1  Gait Score: 7 Gait total score  Total Score: 15/28 Overall total score         Tinetti Tool Score Risk of Falls  <19 = High Fall Risk  19-24 = Moderate Fall Risk  25-28 = Low Fall Risk  Tinetti ME. Performance-Oriented Assessment of Mobility Problems in Elderly Patients. Shen 66; [id number].  (Scoring Description: PT Bulletin Feb. 10, 1993)    Older adults: Carmen Parrish et al, 2009; n = 1000 [facility] elderly evaluated with ABC, MILTON, ADL, and IADL)  · Mean MILTON score for males aged 69-68 years = 26.21(3.40)  · Mean MILTON score for females age 69-68 years = 25.16(4.30)  · Mean MILTON score for males over 80 years = 23.29(6.02)  · Mean MILTON score for females over 80 years = 17.20(8.32)        Physical Therapy Evaluation Charge Determination   History Examination Presentation Decision-Making   HIGH Complexity :3+ comorbidities / personal factors will impact the outcome/ POC  MEDIUM Complexity : 3 Standardized tests and measures addressing body structure, function, activity limitation and / or participation in recreation  LOW Complexity : Stable, uncomplicated  MEDIUM Complexity : FOTO score of 26-74      Based on the above components, the patient evaluation is determined to be of the following complexity level: LOW     Pain Ratin/10, L hip    Activity Tolerance:   Fair      After treatment patient left in no apparent distress:   Sitting in chair, Call bell within reach, and Caregiver / family present    COMMUNICATION/EDUCATION:   The patients plan of care was discussed with: Registered nurse. Fall prevention education was provided and the patient/caregiver indicated understanding. and Patient/family agree to work toward stated goals and plan of care.     Thank you for this referral.  Merissa Millard Scripture   Time Calculation: 48 mins

## 2022-07-24 LAB
ALBUMIN SERPL-MCNC: 3.4 G/DL (ref 3.5–5)
ALBUMIN/GLOB SERPL: 0.9 {RATIO} (ref 1.1–2.2)
ALP SERPL-CCNC: 61 U/L (ref 45–117)
ALT SERPL-CCNC: 12 U/L (ref 12–78)
ANION GAP SERPL CALC-SCNC: 4 MMOL/L (ref 5–15)
AST SERPL-CCNC: 10 U/L (ref 15–37)
BASOPHILS # BLD: 0 K/UL (ref 0–0.1)
BASOPHILS NFR BLD: 1 % (ref 0–1)
BILIRUB SERPL-MCNC: 0.6 MG/DL (ref 0.2–1)
BUN SERPL-MCNC: 8 MG/DL (ref 6–20)
BUN/CREAT SERPL: 12 (ref 12–20)
CALCIUM SERPL-MCNC: 9.2 MG/DL (ref 8.5–10.1)
CHLORIDE SERPL-SCNC: 109 MMOL/L (ref 97–108)
CO2 SERPL-SCNC: 29 MMOL/L (ref 21–32)
CREAT SERPL-MCNC: 0.67 MG/DL (ref 0.55–1.02)
DIFFERENTIAL METHOD BLD: NORMAL
EOSINOPHIL # BLD: 0.2 K/UL (ref 0–0.4)
EOSINOPHIL NFR BLD: 3 % (ref 0–7)
ERYTHROCYTE [DISTWIDTH] IN BLOOD BY AUTOMATED COUNT: 13.3 % (ref 11.5–14.5)
GLOBULIN SER CALC-MCNC: 3.6 G/DL (ref 2–4)
GLUCOSE SERPL-MCNC: 84 MG/DL (ref 65–100)
HCT VFR BLD AUTO: 37.9 % (ref 35–47)
HGB BLD-MCNC: 12.6 G/DL (ref 11.5–16)
IMM GRANULOCYTES # BLD AUTO: 0 K/UL (ref 0–0.04)
IMM GRANULOCYTES NFR BLD AUTO: 0 % (ref 0–0.5)
LYMPHOCYTES # BLD: 1 K/UL (ref 0.8–3.5)
LYMPHOCYTES NFR BLD: 15 % (ref 12–49)
MCH RBC QN AUTO: 30.8 PG (ref 26–34)
MCHC RBC AUTO-ENTMCNC: 33.2 G/DL (ref 30–36.5)
MCV RBC AUTO: 92.7 FL (ref 80–99)
MONOCYTES # BLD: 0.6 K/UL (ref 0–1)
MONOCYTES NFR BLD: 10 % (ref 5–13)
NEUTS SEG # BLD: 4.5 K/UL (ref 1.8–8)
NEUTS SEG NFR BLD: 71 % (ref 32–75)
NRBC # BLD: 0 K/UL (ref 0–0.01)
NRBC BLD-RTO: 0 PER 100 WBC
PLATELET # BLD AUTO: 205 K/UL (ref 150–400)
PMV BLD AUTO: 11 FL (ref 8.9–12.9)
POTASSIUM SERPL-SCNC: 3.7 MMOL/L (ref 3.5–5.1)
PROT SERPL-MCNC: 7 G/DL (ref 6.4–8.2)
RBC # BLD AUTO: 4.09 M/UL (ref 3.8–5.2)
SODIUM SERPL-SCNC: 142 MMOL/L (ref 136–145)
WBC # BLD AUTO: 6.3 K/UL (ref 3.6–11)

## 2022-07-24 PROCEDURE — 74011000250 HC RX REV CODE- 250: Performed by: PHYSICIAN ASSISTANT

## 2022-07-24 PROCEDURE — 80053 COMPREHEN METABOLIC PANEL: CPT

## 2022-07-24 PROCEDURE — 36415 COLL VENOUS BLD VENIPUNCTURE: CPT

## 2022-07-24 PROCEDURE — 74011250637 HC RX REV CODE- 250/637: Performed by: PHYSICIAN ASSISTANT

## 2022-07-24 PROCEDURE — 85025 COMPLETE CBC W/AUTO DIFF WBC: CPT

## 2022-07-24 PROCEDURE — 97116 GAIT TRAINING THERAPY: CPT

## 2022-07-24 PROCEDURE — 97530 THERAPEUTIC ACTIVITIES: CPT

## 2022-07-24 PROCEDURE — 65270000029 HC RM PRIVATE

## 2022-07-24 PROCEDURE — 94761 N-INVAS EAR/PLS OXIMETRY MLT: CPT

## 2022-07-24 RX ADMIN — SODIUM CHLORIDE, PRESERVATIVE FREE 10 ML: 5 INJECTION INTRAVENOUS at 14:00

## 2022-07-24 RX ADMIN — FLUOXETINE 10 MG: 10 CAPSULE ORAL at 08:35

## 2022-07-24 RX ADMIN — ACETAMINOPHEN 650 MG: 325 TABLET ORAL at 15:13

## 2022-07-24 RX ADMIN — SODIUM CHLORIDE, PRESERVATIVE FREE 10 ML: 5 INJECTION INTRAVENOUS at 05:22

## 2022-07-24 RX ADMIN — BACLOFEN 10 MG: 10 TABLET ORAL at 08:32

## 2022-07-24 RX ADMIN — BACLOFEN 10 MG: 10 TABLET ORAL at 17:35

## 2022-07-24 RX ADMIN — ACETAMINOPHEN 650 MG: 325 TABLET ORAL at 08:33

## 2022-07-24 RX ADMIN — DOCUSATE SODIUM 50MG AND SENNOSIDES 8.6MG 1 TABLET: 8.6; 5 TABLET, FILM COATED ORAL at 17:35

## 2022-07-24 RX ADMIN — DOCUSATE SODIUM 50MG AND SENNOSIDES 8.6MG 1 TABLET: 8.6; 5 TABLET, FILM COATED ORAL at 08:32

## 2022-07-24 RX ADMIN — POLYETHYLENE GLYCOL 3350 17 G: 17 POWDER, FOR SOLUTION ORAL at 08:32

## 2022-07-24 RX ADMIN — BACLOFEN 10 MG: 10 TABLET ORAL at 21:22

## 2022-07-24 RX ADMIN — FAMOTIDINE 20 MG: 20 TABLET ORAL at 08:32

## 2022-07-24 RX ADMIN — BACLOFEN 10 MG: 10 TABLET ORAL at 12:35

## 2022-07-24 RX ADMIN — FAMOTIDINE 20 MG: 20 TABLET ORAL at 17:34

## 2022-07-24 RX ADMIN — ACETAMINOPHEN 650 MG: 325 TABLET ORAL at 21:22

## 2022-07-24 RX ADMIN — ASPIRIN 81 MG: 81 TABLET, COATED ORAL at 17:34

## 2022-07-24 RX ADMIN — ASPIRIN 81 MG: 81 TABLET, COATED ORAL at 08:32

## 2022-07-24 NOTE — PROGRESS NOTES
Bedside and Verbal shift change report given to Katy Olea RN (oncoming nurse) by Jarett Melendez RN (offgoing nurse). Report included the following information SBAR and MAR.

## 2022-07-24 NOTE — PROGRESS NOTES
Problem: Falls - Risk of  Goal: *Absence of Falls  Description: Document [place] Fall Risk and appropriate interventions in the flowsheet.   Outcome: Progressing Towards Goal  Note: Fall Risk Interventions:  Mobility Interventions: Bed/chair exit alarm, Communicate number of staff needed for ambulation/transfer, Patient to call before getting OOB         Medication Interventions: Assess postural VS orthostatic hypotension, Evaluate medications/consider consulting pharmacy, Bed/chair exit alarm    Elimination Interventions: Bed/chair exit alarm, Call light in reach, Patient to call for help with toileting needs    History of Falls Interventions: Bed/chair exit alarm, Door open when patient unattended, Investigate reason for fall         Problem: Patient Education: Go to Patient Education Activity  Goal: Patient/Family Education  Outcome: Progressing Towards Goal     Problem: Hip Replacement: Day of Surgery/Unit  Goal: Off Pathway (Use only if patient is Off Pathway)  Outcome: Progressing Towards Goal  Goal: Activity/Safety  Outcome: Progressing Towards Goal  Goal: Consults, if ordered  Outcome: Progressing Towards Goal  Goal: Diagnostic Test/Procedures  Outcome: Progressing Towards Goal  Goal: Nutrition/Diet  Outcome: Progressing Towards Goal  Goal: Medications  Outcome: Progressing Towards Goal  Goal: Respiratory  Outcome: Progressing Towards Goal  Goal: Treatments/Interventions/Procedures  Outcome: Progressing Towards Goal  Goal: Psychosocial  Outcome: Progressing Towards Goal  Goal: *Initiate mobility  Outcome: Progressing Towards Goal  Goal: *Optimal pain control at patient's stated goal  Outcome: Progressing Towards Goal  Goal: *Hemodynamically stable  Outcome: Progressing Towards Goal     Problem: Hip Replacement: Post Op Day 1  Goal: Off Pathway (Use only if patient is Off Pathway)  Outcome: Progressing Towards Goal  Goal: Activity/Safety  Outcome: Progressing Towards Goal  Goal: Diagnostic Test/Procedures  Outcome: Progressing Towards Goal  Goal: Nutrition/Diet  Outcome: Progressing Towards Goal  Goal: Medications  Outcome: Progressing Towards Goal  Goal: Respiratory  Outcome: Progressing Towards Goal  Goal: Treatments/Interventions/Procedures  Outcome: Progressing Towards Goal  Goal: Psychosocial  Outcome: Progressing Towards Goal  Goal: Discharge Planning  Outcome: Progressing Towards Goal  Goal: *Demonstrates progressive activity  Outcome: Progressing Towards Goal  Goal: *Optimal pain control at patient's stated goal  Outcome: Progressing Towards Goal  Goal: *Hemodynamically stable  Outcome: Progressing Towards Goal  Goal: *Discharge plan identified  Outcome: Progressing Towards Goal     Problem: Hip Replacement: Post-Op Day 2  Goal: Off Pathway (Use only if patient is Off Pathway)  Outcome: Progressing Towards Goal  Goal: Activity/Safety  Outcome: Progressing Towards Goal  Goal: Diagnostic Test/Procedures  Outcome: Progressing Towards Goal  Goal: Nutrition/Diet  Outcome: Progressing Towards Goal  Goal: Medications  Outcome: Progressing Towards Goal  Goal: Respiratory  Outcome: Progressing Towards Goal  Goal: Treatments/Interventions/Procedures  Outcome: Progressing Towards Goal  Goal: Psychosocial  Outcome: Progressing Towards Goal  Goal: *Met physical therapy criteria for discharge to the next level of care  Outcome: Progressing Towards Goal  Goal: *Optimal pain control with oral analgesia  Outcome: Progressing Towards Goal  Goal: *Hemodynamically stable  Outcome: Progressing Towards Goal  Goal: *Tolerating diet  Outcome: Progressing Towards Goal  Goal: *Verbalizes understanding of any indicated hip precautions  Outcome: Progressing Towards Goal  Goal: *Patient verbalizes understanding of discharge instructions  Outcome: Progressing Towards Goal     Problem: Patient Education: Go to Patient Education Activity  Goal: Patient/Family Education  Outcome: Progressing Towards Goal

## 2022-07-24 NOTE — PROGRESS NOTES
[facility] Adult  Hospitalist Group                                                                                          Hospitalist Progress Note  Fabiola Nichols NP  Answering service: [phone] from in house phone        Date of Service:  2022  NAME:  Abdullahi Moy  :  1965  MRN:  [id number]      Admission Summary:   Per H&P, Abdullahi Moy is a 61 y.o. female with past medical history of multiple sclerosis presented to the ED with chief complaint of left hip pain after fall. Patient reportedly had a ground level fall, after tripping and landing on hard tile floor in her bedroom at about 10 p.m. tonight. She complains of left hip pain, sudden after fall, moderate to severe, aggravated with movement, weight bearing, or touch. She denies any head/ neck trauma or loss of consciousness. She notes having chronic left foot drop with left sided weakness secondary to MS. She notes that she takes Baclofen for the same. On arrival in the ED, workup included left femur xray showing acute left femoral neck fractures. ED then requested admission to the hospitalist service. Per report from ED MD, orthopedic surgery service are aware of consultation       Interval history / Subjective:     I saw the patient this morning on rounds. Was up in the chair at the moment of the encounter. Pain controlled with acetaminophen.   Subsequently I saw her walking in the hamm with physical therapy       Assessment & Plan:        Left femoral neck fracture  S/p ground-level fall  Orthopedics following, appreciate recommendations  Postop day 2 percutaneous pin  Continuing multimodal pain control  Working with physical therapy recommend IPR    MS  I reviewed medications with the patient  Continue baclofen    Bradycardia  No further bradycardia noted       Code status: DNR  Prophylaxis: ASA  Care Plan discussed with: Patient, family, nurse, care manager,   Anticipated Disposition: Continuing to work with physical therapy who recommend IPR. Hospital Problems  Date Reviewed: 6/2/2017            Codes Class Noted POA    Multiple sclerosis ([facility] ICD-10-CM: G35  ICD-9-CM: 889  7/22/2022 Unknown        Closed fracture of neck of left femur ([facility] ICD-10-CM: E62.375J  ICD-9-CM: 820.8  7/22/2022 Unknown        Acute hip pain, left ICD-10-CM: M25.552  ICD-9-CM: 719.45  7/22/2022 Unknown        Fall from ground level ICD-10-CM: S15.70QU  ICD-9-CM: E888.9  7/22/2022 Unknown         Review of Systems:   A comprehensive review of systems was negative except for that written in the HPI. Vital Signs:    Last 24hrs VS reviewed since prior progress note. Most recent are:  Visit Vitals  /68 (BP 1 Location: Right upper arm, BP Patient Position: Sitting)   Pulse 100   Temp 97.7 °F (36.5 °C)   Resp 16   Ht 5' 7\" (1.702 m)   Wt 57 kg (125 lb 10.6 oz)   SpO2 95%   BMI 19.68 kg/m²         Intake/Output Summary (Last 24 hours) at 7/24/2022 1202  Last data filed at 7/24/2022 0827  Gross per 24 hour   Intake 240 ml   Output --   Net 240 ml          Physical Examination:     I had a face to face encounter with this patient and independently examined them on 7/24/2022 as outlined below:          Constitutional:  No acute distress, cooperative, pleasant    ENT:  Oral mucosa moist, oropharynx benign. Resp:  CTA bilaterally. No wheezing/rhonchi/rales. No accessory muscle use. CV:  Regular rhythm, normal rate, no murmurs, gallops, rubs    GI:  Soft, non distended, non tender. normoactive bowel sounds, no hepatosplenomegaly     Musculoskeletal:  No edema, warm, 2+ pulses throughout    Neurologic:  Moves all extremities.   AAOx3, CN II-XII reviewed            Data Review:    Review and/or order of clinical lab test  Review and/or order of tests in the radiology section of CPT  I personally reviewed  Image      Labs:     Recent Labs     07/24/22  0503 07/23/22  0512   WBC 6.3 6.0   HGB 12.6 11.5 HCT 37.9 34.4*    177       Recent Labs     07/24/22  0503 07/23/22  0512 07/21/22  2352    141 141   K 3.7 3.6 3.9   * 109* 107   CO2 29 27 30   BUN 8 8 14   CREA 0.67 0.55 0.72   GLU 84 83 99   CA 9.2 8.6 9.3   MG  --  2.0  --        Recent Labs     07/24/22  0503 07/21/22  2352   ALT 12 22   AP 61 64   TBILI 0.6 0.2   TP 7.0 7.2   ALB 3.4* 3.9   GLOB 3.6 3.3       Recent Labs     07/22/22  1325   INR 1.0   PTP 10.8   APTT 27.2        No results for input(s): FE, TIBC, PSAT, FERR in the last 72 hours. No results found for: FOL, RBCF   No results for input(s): PH, PCO2, PO2 in the last 72 hours. No results for input(s): CPK, CKNDX, TROIQ in the last 72 hours.     No lab exists for component: CPKMB  No results found for: CHOL, CHOLX, CHLST, CHOLV, HDL, HDLP, LDL, LDLC, DLDLP, TGLX, TRIGL, TRIGP, CHHD, CHHDX  No results found for: GLUCPOC  No results found for: COLOR, APPRN, SPGRU, REFSG, ALLIE, PROTU, GLUCU, KETU, BILU, UROU, MEJIA, LEUKU, GLUKE, EPSU, BACTU, WBCU, RBCU, CASTS, UCRY      Medications Reviewed:     Current Facility-Administered Medications   Medication Dose Route Frequency    sodium chloride (NS) flush 5-40 mL  5-40 mL IntraVENous Q8H    sodium chloride (NS) flush 5-40 mL  5-40 mL IntraVENous PRN    acetaminophen (TYLENOL) tablet 650 mg  650 mg Oral Q6H PRN    Or    acetaminophen (TYLENOL) suppository 650 mg  650 mg Rectal Q6H PRN    polyethylene glycol (MIRALAX) packet 17 g  17 g Oral DAILY PRN    ondansetron (ZOFRAN ODT) tablet 4 mg  4 mg Oral Q8H PRN    Or    ondansetron (ZOFRAN) injection 4 mg  4 mg IntraVENous Q6H PRN    morphine injection 1 mg  1 mg IntraVENous Q4H PRN    baclofen (LIORESAL) tablet 10 mg  10 mg Oral QID    FLUoxetine (PROzac) capsule 10 mg  10 mg Oral DAILY    HYDROmorphone (DILAUDID) injection 0.5 mg  0.5 mg IntraVENous Q4H PRN    lidocaine 4 % patch 1 Patch  1 Patch TransDERmal Q24H    oxyCODONE IR (ROXICODONE) tablet 5 mg  5 mg Oral Q4H PRN    traMADoL (ULTRAM) tablet 50 mg  50 mg Oral Q6H PRN    hydrOXYzine HCL (ATARAX) tablet 10 mg  10 mg Oral Q8H PRN    famotidine (PEPCID) tablet 20 mg  20 mg Oral BID    senna-docusate (PERICOLACE) 8.6-50 mg per tablet 1 Tablet  1 Tablet Oral BID    polyethylene glycol (MIRALAX) packet 17 g  17 g Oral DAILY    bisacodyL (DULCOLAX) suppository 10 mg  10 mg Rectal DAILY PRN    aspirin delayed-release tablet 81 mg  81 mg Oral BID     ______________________________________________________________________  EXPECTED LENGTH OF STAY: - - -  ACTUAL LENGTH OF STAY:          2                 Mickael Cowden, NP

## 2022-07-24 NOTE — PROGRESS NOTES
Problem: Mobility Impaired (Adult and Pediatric)  Goal: *Acute Goals and Plan of Care (Insert Text)  Description: FUNCTIONAL STATUS PRIOR TO ADMISSION: Patient was modified independent using a single point cane within her home and short distance within the community and used a power wheelchair for longer distances within the community for functional mobility. Pt reports having a fall within the last year. HOME SUPPORT PRIOR TO ADMISSION: The patient lived with her spouse but did not require assist.    Physical Therapy Goals  Initiated 7/23/2022  1. Patient will move from supine to sit and sit to supine  in bed with independence within 7 day(s). 2.  Patient will transfer from bed to chair and chair to bed with modified independence using the least restrictive device within 7 day(s). 3.  Patient will perform sit to stand with modified independence within 7 day(s). 4.  Patient will ambulate with modified independence for 150 feet with the least restrictive device within 7 day(s). 5.  Patient will ascend/descend 1 step  with modified independence within 7 day(s). Outcome: Progressing Towards Goal   PHYSICAL THERAPY TREATMENT  Patient: Gerhardt Shook (58 y.o. female)  Date: 7/24/2022  Diagnosis: Closed fracture of neck of left femur (HCC) [S72.002A]  Acute hip pain, left [M25.552]  Fall from ground level [W18.30XA]  Multiple sclerosis ([facility] [G35] <principal problem not specified>  Procedure(s) (LRB):  LEFT PERCUTANEOUS HIP PINNING (Left) 2 Days Post-Op  Precautions: Fall, PWB (50% WB LLE)  Chart, physical therapy assessment, plan of care and goals were reviewed. ASSESSMENT  Patient continues with skilled PT services and is progressing towards goals. She demonstrates the ability to ambulate slightly increased distance. Patient also demonstrates the ability to complete sit<>stand transfers with CGA v. Min A today.  She continues to require CGA for turns to secure balance secondary to decreased L hamstring strength. She continues to need assistance for donning shoes, socks, and brace. Patient will need assistance for stairs as well. Current Level of Function Impacting Discharge (mobility/balance): CGA- Min A     Other factors to consider for discharge: medical stability, decreased independence, stair training         PLAN :  Patient continues to benefit from skilled intervention to address the above impairments. Continue treatment per established plan of care. to address goals. Recommendation for discharge: (in order for the patient to meet his/her long term goals)  Therapy 3 hours per day 5-7 days per week    This discharge recommendation:  Has been made in collaboration with the attending provider and/or case management    IF patient discharges home will need the following DME: to be determined (TBD)       SUBJECTIVE:   Patient stated I was going to [facility] 2x a week.     OBJECTIVE DATA SUMMARY:   Critical Behavior:  Neurologic State: Alert  Orientation Level: Oriented X4  Cognition: Appropriate decision making, Appropriate for age attention/concentration, Appropriate safety awareness, Follows commands  Safety/Judgement: Good awareness of safety precautions, Home safety, Insight into deficits  Functional Mobility Training:  Bed Mobility:                    Transfers:  Sit to Stand: Contact guard assistance  Stand to Sit: Contact guard assistance                             Balance:  Sitting: Intact  Standing: Impaired  Standing - Static: Good;Constant support  Standing - Dynamic : Good;Constant support  Ambulation/Gait Training:  Distance (ft): 100 Feet (ft)  Assistive Device: Walker, rolling;Gait belt;Orthotic device  Ambulation - Level of Assistance: Contact guard assistance;Assist x1        Gait Abnormalities: Step to gait; Decreased step clearance; Foot drop     Left Side Weight Bearing: Partial (%)     Stance: Left decreased  Speed/Myriam: Pace decreased (<100 feet/min)  Step Length: Right shortened;Left shortened                    Stairs: Therapeutic Exercises:     Pain Rating:      Activity Tolerance:   Good    After treatment patient left in no apparent distress:   Sitting in chair and Call bell within reach    COMMUNICATION/COLLABORATION:   The patients plan of care was discussed with: Registered nurse.      Teresita Florian, PT   Time Calculation: 32 mins

## 2022-07-24 NOTE — PROGRESS NOTES
Bedside and Verbal shift change report given to ROZ Bourgeois (oncoming nurse) by Anupama Alves RN (offgoing nurse). Report included the following information SBAR and MAR.

## 2022-07-25 PROCEDURE — 97535 SELF CARE MNGMENT TRAINING: CPT

## 2022-07-25 PROCEDURE — 65270000029 HC RM PRIVATE

## 2022-07-25 PROCEDURE — 74011000250 HC RX REV CODE- 250: Performed by: PHYSICIAN ASSISTANT

## 2022-07-25 PROCEDURE — 97530 THERAPEUTIC ACTIVITIES: CPT

## 2022-07-25 PROCEDURE — 94761 N-INVAS EAR/PLS OXIMETRY MLT: CPT

## 2022-07-25 PROCEDURE — 74011250637 HC RX REV CODE- 250/637: Performed by: PHYSICIAN ASSISTANT

## 2022-07-25 PROCEDURE — 97116 GAIT TRAINING THERAPY: CPT

## 2022-07-25 RX ADMIN — SODIUM CHLORIDE, PRESERVATIVE FREE 10 ML: 5 INJECTION INTRAVENOUS at 22:00

## 2022-07-25 RX ADMIN — FLUOXETINE 10 MG: 10 CAPSULE ORAL at 09:03

## 2022-07-25 RX ADMIN — ASPIRIN 81 MG: 81 TABLET, COATED ORAL at 09:03

## 2022-07-25 RX ADMIN — DOCUSATE SODIUM 50MG AND SENNOSIDES 8.6MG 1 TABLET: 8.6; 5 TABLET, FILM COATED ORAL at 18:16

## 2022-07-25 RX ADMIN — BACLOFEN 10 MG: 10 TABLET ORAL at 09:03

## 2022-07-25 RX ADMIN — DOCUSATE SODIUM 50MG AND SENNOSIDES 8.6MG 1 TABLET: 8.6; 5 TABLET, FILM COATED ORAL at 09:03

## 2022-07-25 RX ADMIN — FAMOTIDINE 20 MG: 20 TABLET ORAL at 18:16

## 2022-07-25 RX ADMIN — ACETAMINOPHEN 650 MG: 325 TABLET ORAL at 09:03

## 2022-07-25 RX ADMIN — BACLOFEN 10 MG: 10 TABLET ORAL at 12:50

## 2022-07-25 RX ADMIN — ASPIRIN 81 MG: 81 TABLET, COATED ORAL at 18:16

## 2022-07-25 RX ADMIN — FAMOTIDINE 20 MG: 20 TABLET ORAL at 09:03

## 2022-07-25 RX ADMIN — BACLOFEN 10 MG: 10 TABLET ORAL at 18:16

## 2022-07-25 RX ADMIN — ACETAMINOPHEN 650 MG: 325 TABLET ORAL at 14:55

## 2022-07-25 RX ADMIN — ACETAMINOPHEN 650 MG: 325 TABLET ORAL at 21:40

## 2022-07-25 RX ADMIN — BACLOFEN 10 MG: 10 TABLET ORAL at 21:40

## 2022-07-25 RX ADMIN — POLYETHYLENE GLYCOL 3350 17 G: 17 POWDER, FOR SOLUTION ORAL at 09:03

## 2022-07-25 NOTE — PROGRESS NOTES
Transition of Care: [facility] has accepted patient and Pearley Dowse is being started today. BLS transport at discharge. Noted [facility] has accepted patient if plan is home with [facility]. CM spoke with attending, and confirmed plans for IPR. CM received call from Dominick Bry with [phone] who notified CM of acceptance and they are starting auth. CM updated patient at bedside.     BARTOLOME Martinez/CRM

## 2022-07-25 NOTE — PROGRESS NOTES
[id number]  Hospitalist Group                                                                                          Hospitalist Progress Note  Solange Ashraf NP  Answering service: [phone] OR [phone] from in house phone        Date of Service:  2022  NAME:  Richmond Saleem  :  1965  MRN:  [id number]      Admission Summary:   Per H&P, Richmond Saleem is a 61 y.o. female with past medical history of multiple sclerosis presented to the ED with chief complaint of left hip pain after fall. Patient reportedly had a ground level fall, after tripping and landing on hard tile floor in her bedroom at about 10 p.m. tonight. She complains of left hip pain, sudden after fall, moderate to severe, aggravated with movement, weight bearing, or touch. She denies any head/ neck trauma or loss of consciousness. She notes having chronic left foot drop with left sided weakness secondary to MS. She notes that she takes Baclofen for the same. On arrival in the ED, workup included left femur xray showing acute left femoral neck fractures. ED then requested admission to the hospitalist service. Per report from ED MD, orthopedic surgery service are aware of consultation       Interval history / Subjective:     Patient seen on morning rounds, no acute events overnight, no complaints this morning. Pain currently controlled       Assessment & Plan:        Left femoral neck fracture  S/p ground-level fall  Orthopedics following, appreciate recommendations  Postop day 3 percutaneous pin  Continuing multimodal pain control  Working with physical therapy recommend IPR    MS  I reviewed medications with the patient  Continue baclofen    Bradycardia  No further bradycardia noted       Code status: DNR  Prophylaxis: ASA  Care Plan discussed with: Patient, family, nurse, care manager,   Anticipated Disposition: Continuing to work with physical therapy who recommend IPR.      Hospital Problems Date Reviewed: 6/2/2017            Codes Class Noted POA    Multiple sclerosis ([facility] ICD-10-CM: G35  ICD-9-CM: 397  7/22/2022 Unknown        Closed fracture of neck of left femur ([facility] ICD-10-CM: E82.985O  ICD-9-CM: 820.8  7/22/2022 Unknown        Acute hip pain, left ICD-10-CM: M25.552  ICD-9-CM: 719.45  7/22/2022 Unknown        Fall from ground level ICD-10-CM: Q36.43HC  ICD-9-CM: E888.9  7/22/2022 Unknown         Review of Systems:   A comprehensive review of systems was negative except for that written in the HPI. Vital Signs:    Last 24hrs VS reviewed since prior progress note. Most recent are:  Visit Vitals  /67 (BP 1 Location: Right upper arm, BP Patient Position: At rest)   Pulse 85   Temp 97.6 °F (36.4 °C)   Resp 16   Ht 5' 7\" (1.702 m)   Wt 57 kg (125 lb 10.6 oz)   SpO2 97%   BMI 19.68 kg/m²       No intake or output data in the 24 hours ending 07/25/22 1153       Physical Examination:     I had a face to face encounter with this patient and independently examined them on 7/25/2022 as outlined below:          Constitutional:  No acute distress, cooperative, pleasant    ENT:  Oral mucosa moist, oropharynx benign. Resp:  CTA bilaterally. No wheezing/rhonchi/rales. No accessory muscle use. CV:  Regular rhythm, normal rate, no murmurs, gallops, rubs    GI:  Soft, non distended, non tender. normoactive bowel sounds, no hepatosplenomegaly     Musculoskeletal:  No edema, warm, 2+ pulses throughout    Neurologic:  Moves all extremities.   AAOx3, CN II-XII reviewed            Data Review:    Review and/or order of clinical lab test  Review and/or order of tests in the radiology section of CPT  I personally reviewed  Image      Labs:     Recent Labs     07/24/22  0503 07/23/22  0512   WBC 6.3 6.0   HGB 12.6 11.5   HCT 37.9 34.4*    177       Recent Labs     07/24/22  0503 07/23/22  0512    141   K 3.7 3.6   * 109*   CO2 29 27   BUN 8 8   CREA 0.67 0.55   GLU 84 83   CA 9.2 8.6 MG  --  2.0       Recent Labs     07/24/22  0503   ALT 12   AP 61   TBILI 0.6   TP 7.0   ALB 3.4*   GLOB 3.6       Recent Labs     07/22/22  1325   INR 1.0   PTP 10.8   APTT 27.2        No results for input(s): FE, TIBC, PSAT, FERR in the last 72 hours. No results found for: FOL, RBCF   No results for input(s): PH, PCO2, PO2 in the last 72 hours. No results for input(s): CPK, CKNDX, TROIQ in the last 72 hours.     No lab exists for component: CPKMB  No results found for: CHOL, CHOLX, CHLST, CHOLV, HDL, HDLP, LDL, LDLC, DLDLP, TGLX, TRIGL, TRIGP, CHHD, CHHDX  No results found for: GLUCPOC  No results found for: COLOR, APPRN, SPGRU, REFSG, ALLIE, PROTU, GLUCU, KETU, BILU, UROU, MEJIA, LEUKU, GLUKE, EPSU, BACTU, WBCU, RBCU, CASTS, UCRY      Medications Reviewed:     Current Facility-Administered Medications   Medication Dose Route Frequency    sodium chloride (NS) flush 5-40 mL  5-40 mL IntraVENous Q8H    sodium chloride (NS) flush 5-40 mL  5-40 mL IntraVENous PRN    acetaminophen (TYLENOL) tablet 650 mg  650 mg Oral Q6H PRN    Or    acetaminophen (TYLENOL) suppository 650 mg  650 mg Rectal Q6H PRN    polyethylene glycol (MIRALAX) packet 17 g  17 g Oral DAILY PRN    ondansetron (ZOFRAN ODT) tablet 4 mg  4 mg Oral Q8H PRN    Or    ondansetron (ZOFRAN) injection 4 mg  4 mg IntraVENous Q6H PRN    baclofen (LIORESAL) tablet 10 mg  10 mg Oral QID    FLUoxetine (PROzac) capsule 10 mg  10 mg Oral DAILY    HYDROmorphone (DILAUDID) injection 0.5 mg  0.5 mg IntraVENous Q4H PRN    lidocaine 4 % patch 1 Patch  1 Patch TransDERmal Q24H    oxyCODONE IR (ROXICODONE) tablet 5 mg  5 mg Oral Q4H PRN    traMADoL (ULTRAM) tablet 50 mg  50 mg Oral Q6H PRN    hydrOXYzine HCL (ATARAX) tablet 10 mg  10 mg Oral Q8H PRN    famotidine (PEPCID) tablet 20 mg  20 mg Oral BID    senna-docusate (PERICOLACE) 8.6-50 mg per tablet 1 Tablet  1 Tablet Oral BID    polyethylene glycol (MIRALAX) packet 17 g  17 g Oral DAILY    bisacodyL (DULCOLAX) suppository 10 mg  10 mg Rectal DAILY PRN    aspirin delayed-release tablet 81 mg  81 mg Oral BID     ______________________________________________________________________  EXPECTED LENGTH OF STAY: 3d 7h  ACTUAL LENGTH OF STAY:          [address], NP

## 2022-07-25 NOTE — PROGRESS NOTES
Problem: Mobility Impaired (Adult and Pediatric)  Goal: *Acute Goals and Plan of Care (Insert Text)  Description: FUNCTIONAL STATUS PRIOR TO ADMISSION: Patient was modified independent using a single point cane within her home and short distance within the community and used a power wheelchair for longer distances within the community for functional mobility. Pt reports having a fall within the last year. HOME SUPPORT PRIOR TO ADMISSION: The patient lived with her spouse but did not require assist.    Physical Therapy Goals  Initiated 7/23/2022  1. Patient will move from supine to sit and sit to supine  in bed with independence within 7 day(s). 2.  Patient will transfer from bed to chair and chair to bed with modified independence using the least restrictive device within 7 day(s). 3.  Patient will perform sit to stand with modified independence within 7 day(s). 4.  Patient will ambulate with modified independence for 150 feet with the least restrictive device within 7 day(s). 5.  Patient will ascend/descend 1 step  with modified independence within 7 day(s). Outcome: Progressing Towards Goal     PHYSICAL THERAPY TREATMENT  Patient: Michelle Morin (52 y.o. female)  Date: 7/25/2022  Diagnosis: Closed fracture of neck of left femur (HCC) [S72.002A]  Acute hip pain, left [M25.552]  Fall from ground level [W18.30XA]  Multiple sclerosis (HCC) [G35] <principal problem not specified>  Procedure(s) (LRB):  LEFT PERCUTANEOUS HIP PINNING (Left) 3 Days Post-Op  Precautions: Fall, PWB (50% WB LLE)  Chart, physical therapy assessment, plan of care and goals were reviewed. ASSESSMENT  Patient continues with skilled PT services and is progressing towards goals. Pt generally mobilized at a CGA to SBA level during today's session. Pt received up in bedside chair, on RA, agreeable to work with therapy. Pt dependent for R sock and AFO donning.  Pt then ambulated in hallway with RW, while maintaining WB precautions. Pt took a brief seated rest break before continuing trip. During ambulation, pt observed with extremely narrow EVIE however was able to modify with VC. Pt elected to remain up in bedside chair upon completion of session. Pt educated on transfer training, fall precaution, activity pacing during session. Per conversation with pt, her family is arranging for a contractor to install ramp for [place] and pt advised and agreeable to installing a hand rail for 2 interior stairs however will need time to arrange. Pt states her  works during the day and she would be alone in house during that time for next ~ week before her son will be able to come stay at house. Due to MS, PWB, fall hx, and stairs to navigate inside the home without HR and prolonged periods during the day when pt will be alone in house, pt will require rehab upon d/c. [facility] has accepted and currently awaiting auth. Pt agreeable to SNF level rehab if [facility] is unsuccessful. Current Level of Function Impacting Discharge (mobility/balance): Up to CGA for ambulation, still requires stair training if to d/c home    Other factors to consider for discharge: MS, fall hx, will be alone in home for 8+ hours a day         PLAN :  Patient continues to benefit from skilled intervention to address the above impairments. Continue treatment per established plan of care. to address goals. Recommendation for discharge: (in order for the patient to meet his/her long term goals)  Therapy 3 hours per day 5-7 days per week  If not than SNF    This discharge recommendation:  Has been made in collaboration with the attending provider and/or case management    IF patient discharges home will need the following DME: to be determined (TBD)       SUBJECTIVE:   Patient stated i'm glad to see you.     OBJECTIVE DATA SUMMARY:   Critical Behavior:  Neurologic State: Alert  Orientation Level: Oriented X4  Cognition: Appropriate decision making, Appropriate for age attention/concentration, Appropriate safety awareness, Follows commands  Safety/Judgement: Good awareness of safety precautions, Home safety, Insight into deficits  Functional Mobility Training:  Bed Mobility:                    Transfers:  Sit to Stand: Stand-by assistance  Stand to Sit: Stand-by assistance                             Balance:  Sitting: Intact  Standing: Impaired  Standing - Static: Good;Constant support  Standing - Dynamic : Good;Constant support  Ambulation/Gait Training:  Distance (ft): 125 Feet (ft) (over course of 2 trips with seated rest break)  Assistive Device: Walker, rolling;Gait belt;Orthotic device  Ambulation - Level of Assistance: Contact guard assistance;Assist x1        Gait Abnormalities: Step to gait; Decreased step clearance; Foot drop     Left Side Weight Bearing: Partial (%) (50)  Base of Support: Narrowed  Stance: Left decreased  Speed/Myriam: Pace decreased (<100 feet/min)  Step Length: Right shortened;Left shortened    Pain Rating:  Pt did not verbalize pain during session. Activity Tolerance:   Good, SpO2 stable on RA, and requires rest breaks    After treatment patient left in no apparent distress:   Sitting in chair, Heels elevated for pressure relief, and Call bell within reach    COMMUNICATION/COLLABORATION:   The patients plan of care was discussed with: Registered nurse and OT student .      Darylene Michael, PT   Time Calculation: 34 mins

## 2022-07-25 NOTE — PROGRESS NOTES
[facility] by [employer]. Mrs. David Allison is [facility]. She declined communion today. Prayer offered by the  [facility] .     VIRGILIO Briscoe RN, ACSW, LCSW   Page:  [phone]

## 2022-07-25 NOTE — PROGRESS NOTES
Problem: Self Care Deficits Care Plan (Adult)  Goal: *Acute Goals and Plan of Care (Insert Text)  Description: FUNCTIONAL STATUS PRIOR TO ADMISSION: Patient was modified independent using a walking stick in the home. Uses a wheelchair and/or power wheelchair  in the community. Pt was independent in ADLs. Pt with MS at baseline with affected L sided weakness. Has AFO for L foot drop. HOME SUPPORT: The patient lived with  but did not require assist.    Occupational Therapy Goals  Initiated 7/23/2022  1. Patient will perform bathing with minimal assistance/contact guard assist within 7 day(s). 2.  Patient will perform grooming at sink with supervision/set-up within 7 day(s). 3.  Patient will perform toilet transfers with supervision/set-up within 7 day(s). 4.  Patient will perform all aspects of toileting with supervision/set-up within 7 day(s). Outcome: Progressing Towards Goal     OCCUPATIONAL THERAPY TREATMENT  Patient: Jez Shankar (57 y.o. female)  Date: 7/25/2022  Diagnosis: Closed fracture of neck of left femur (HCC) [S72.002A]  Acute hip pain, left [M25.552]  Fall from ground level [W18.30XA]  [facility] ([facility] [G35] <principal problem not specified>  Procedure(s) (LRB):  LEFT PERCUTANEOUS HIP PINNING (Left) 3 Days Post-Op  Precautions: Fall, PWB (50% WB LLE)  Chart, occupational therapy assessment, plan of care, and goals were reviewed. ASSESSMENT  Patient continues with skilled OT services and is progressing towards goals. ADLs limited by L hip pinning resulting in LLE deficits including strength, ROM, pain, and functional reach to LLE. Patient with MS at baseline, further limiting her activity tolerance, standing tolerance, static and dynamic standing tolerance, and independence with functional mobility.  Patient would benefit from future sessions focused on continued problem-solving to address lower body dressing (AFO brace and shoe), as well as safety with higher-level functional mobility as patient below baseline for strength and endurance and would be at risk for falls. Patient is highly motivated and takes initiative to return to baseline and therefore would benefit from skilled OT intervention in IPR setting to promote independence and safety with ADLs and functional mobility. Current Level of Function Impacting Discharge (ADLs): max A lower body ADLs; SBA functional mobility    Other factors to consider for discharge: alone during day, home setup not suitable for safety and independence (no hand rails on stairs, step to get into shower), MS complications         PLAN :  Patient continues to benefit from skilled intervention to address the above impairments. Continue treatment per established plan of care to address goals. Recommend with staff: Recommend with nursing, ADLs with supervision/setup, OOB to chair 3x/day via rolling walker and toileting via functional mobility to and from bathroom. Thank you for completing as able in order to maintain patient strength, endurance and independence. Recommend next OT session: shower (to assess safety with shower transfer, energy conservation, and pacing; need may shower orders    Recommendation for discharge: (in order for the patient to meet his/her long term goals)  Therapy 3 hours per day 5-7 days per week. Patient able to tolerate 3 hours of therapy. If patient discharged home, would need assistance during the day for lower body ADLs and IADLs, as well as supervision for safety with functional mobility (especially managing stairs without hand rails, shower transfer). At this time, patient not safe to return home due to lack of assistance and a home setup that is not conducive to strength deficits related to MS and L hip pinning.     This discharge recommendation:  Has been made in collaboration with the attending provider and/or case management    IF patient discharges home will need the following DME: AE: long handled dressing, grab bars, shower chair, and walker: rolling       SUBJECTIVE:   Patient stated I need you to help me with getting this brace on.    OBJECTIVE DATA SUMMARY:   Cognitive/Behavioral Status:  Neurologic State: Alert  Orientation Level: Oriented X4  Cognition: Appropriate decision making  Perception: Appears intact  Perseveration: No perseveration noted  Safety/Judgement: Awareness of environment    Functional Mobility and Transfers for ADLs:  Bed Mobility:   Received OOB in chair; returned to chair    Transfers:  Sit to Stand: Stand-by assistance  Functional Transfers  Shower Transfer: Minimum assistance (increased time to complete; verbal cues for sequencing, hand placement, RW management and square off; patient stating that she has a ledge to step over at home and needs practice to manage ledge safely). Patient requesting shower stating that she would like to wash her hair. Balance:  Sitting: Intact  Standing: Impaired  Standing - Static: Constant support;Good  Standing - Dynamic : Constant support;Good    ADL Intervention:                 Type of Bath: Chlorhexidine (CHG); Basin/Soap/Water              Lower Body Dressing Assistance  Shoes with Cloth Laces: Maximum assistance (L shoe)  Position Performed: Seated in chair  Cues: Physical assistance;Verbal cues provided;Visual cues provided  Adaptive Equipment Used: Long handled shoe horn  Patient requesting assistance to don L AFO with lace up shoes. Patient stating that PTA she was able to don AFO using functional reach; however functional reach limited and now requiring total assistance to don brace, don shoe, and tie shoelaces. Patient instructed on benefits on leaving laces loose and using long-handled shoe horn to don shoe. Patient demonstrated fair understanding of use of AE to don shoe, but would benefit from continued practice to increase independence with AE.  However, patient still requiring total A to tie shoelaces tight to provide support through LLE 2* MS and LLE weakness. Cognitive Retraining  Safety/Judgement: Awareness of environment    Pain:  Pain improved today per patient report    Activity Tolerance:   Good and requires rest breaks    After treatment patient left in no apparent distress:   Sitting in chair and Call bell within reach    COMMUNICATION/COLLABORATION:   The patients plan of care was discussed with: Physical therapist.     Sal Javed  Time Calculation: 24 mins  Regarding student involvement in patient care:  A student participated in this treatment session. Per CMS Medicare statements and AOTA guidelines I certify that the following was true:  1. I was present and directly observed the entire session. 2. I made all skilled judgments and clinical decisions regarding care. 3. I am the practitioner responsible for assessment, treatment, and documentation.

## 2022-07-26 VITALS
WEIGHT: 125.66 LBS | SYSTOLIC BLOOD PRESSURE: 105 MMHG | RESPIRATION RATE: 17 BRPM | BODY MASS INDEX: 19.72 KG/M2 | HEART RATE: 97 BPM | DIASTOLIC BLOOD PRESSURE: 72 MMHG | HEIGHT: 67 IN | OXYGEN SATURATION: 100 % | TEMPERATURE: 97.5 F

## 2022-07-26 PROCEDURE — 94761 N-INVAS EAR/PLS OXIMETRY MLT: CPT

## 2022-07-26 PROCEDURE — 97535 SELF CARE MNGMENT TRAINING: CPT

## 2022-07-26 PROCEDURE — 74011250637 HC RX REV CODE- 250/637: Performed by: PHYSICIAN ASSISTANT

## 2022-07-26 PROCEDURE — 97116 GAIT TRAINING THERAPY: CPT

## 2022-07-26 PROCEDURE — 74011000250 HC RX REV CODE- 250: Performed by: PHYSICIAN ASSISTANT

## 2022-07-26 PROCEDURE — 97530 THERAPEUTIC ACTIVITIES: CPT

## 2022-07-26 RX ORDER — ASPIRIN 81 MG/1
81 TABLET ORAL 2 TIMES DAILY
Qty: 60 TABLET | Refills: 0 | Status: SHIPPED
Start: 2022-07-26 | End: 2022-08-25

## 2022-07-26 RX ORDER — TRAMADOL HYDROCHLORIDE 50 MG/1
50 TABLET ORAL
Qty: 10 TABLET | Refills: 0 | Status: SHIPPED | OUTPATIENT
Start: 2022-07-26 | End: 2022-07-29

## 2022-07-26 RX ORDER — FAMOTIDINE 20 MG/1
20 TABLET, FILM COATED ORAL 2 TIMES DAILY
Qty: 60 TABLET | Refills: 0 | Status: SHIPPED
Start: 2022-07-26 | End: 2022-08-25

## 2022-07-26 RX ADMIN — SODIUM CHLORIDE, PRESERVATIVE FREE 10 ML: 5 INJECTION INTRAVENOUS at 05:26

## 2022-07-26 RX ADMIN — ASPIRIN 81 MG: 81 TABLET, COATED ORAL at 08:52

## 2022-07-26 RX ADMIN — BACLOFEN 10 MG: 10 TABLET ORAL at 08:53

## 2022-07-26 RX ADMIN — FAMOTIDINE 20 MG: 20 TABLET ORAL at 08:53

## 2022-07-26 RX ADMIN — ACETAMINOPHEN 650 MG: 325 TABLET ORAL at 11:24

## 2022-07-26 RX ADMIN — ACETAMINOPHEN 650 MG: 325 TABLET ORAL at 05:25

## 2022-07-26 RX ADMIN — FLUOXETINE 10 MG: 10 CAPSULE ORAL at 08:54

## 2022-07-26 NOTE — PROGRESS NOTES
Problem: Mobility Impaired (Adult and Pediatric)  Goal: *Acute Goals and Plan of Care (Insert Text)  Description: FUNCTIONAL STATUS PRIOR TO ADMISSION: Patient was modified independent using a single point cane within her home and short distance within the community and used a power wheelchair for longer distances within the community for functional mobility. Pt reports having a fall within the last year. HOME SUPPORT PRIOR TO ADMISSION: The patient lived with her spouse but did not require assist.    Physical Therapy Goals  Initiated 7/23/2022  1. Patient will move from supine to sit and sit to supine  in bed with independence within 7 day(s). 2.  Patient will transfer from bed to chair and chair to bed with modified independence using the least restrictive device within 7 day(s). 3.  Patient will perform sit to stand with modified independence within 7 day(s). 4.  Patient will ambulate with modified independence for 150 feet with the least restrictive device within 7 day(s). 5.  Patient will ascend/descend 1 step  with modified independence within 7 day(s). Outcome: Progressing Towards Goal   PHYSICAL THERAPY TREATMENT  Patient: Daniel Tarango (75 y.o. female)  Date: 7/26/2022  Diagnosis: Closed fracture of neck of left femur (HCC) [S72.002A]  Acute hip pain, left [M25.552]  Fall from ground level [W18.30XA]  [facility] [G35] <principal problem not specified>  Procedure(s) (LRB):  LEFT PERCUTANEOUS HIP PINNING (Left) 4 Days Post-Op  Precautions: Fall, PWB (50% WB LLE)  Chart, physical therapy assessment, plan of care and goals were reviewed. ASSESSMENT  Patient continues with skilled PT services and is progressing towards goals. She is received OOB in chair and agreeable to therapy. Noted pt w/ one shoe donned (R) and the left not. She reports she attempted to stan Left shoe but felt twinge of pain. Left shoe donned w/ max assist (brace in shoe).  Pt amb to bathroom and in hallway w/ RW and CGA while maintaining PWBing (Left). She did report increased fatigue w/ return gait and increased effort to maintain [address]. Pt returned to chair w/ all items in reach. Pt received approval for IPR and to d/c sometime today. Current Level of Function Impacting Discharge (mobility/balance): SBA-CGA x1    Other factors to consider for discharge: History of MS and history of fall/fall risk; home alone during day         PLAN :  Patient continues to benefit from skilled intervention to address the above impairments. Continue treatment per established plan of care. to address goals. Recommendation for discharge: (in order for the patient to meet his/her long term goals)  Therapy 3 hours per day 5-7 days per week    This discharge recommendation:  Has been made in collaboration with the attending provider and/or case management    IF patient discharges home will need the following DME: patient owns DME [facility]) and additional DME to be determined (TBD) at rehab       SUBJECTIVE:   Patient stated I realized when I was trying to put my shoes one that I can't do but so much.     OBJECTIVE DATA SUMMARY:   Critical Behavior:  Neurologic State: Alert  Orientation Level: Oriented X4  Cognition: Appropriate decision making, Appropriate for age attention/concentration, Appropriate safety awareness, Follows commands  Safety/Judgement: Awareness of environment  Functional Mobility Training:  Bed Mobility:     Supine to Sit:  (received OOB in chair)  Sit to Supine:  (returned to chair)           Transfers:  Sit to Stand: Stand-by assistance  Stand to Sit: Stand-by assistance                             Balance:  Sitting: Intact  Standing: Impaired  Standing - Static: Constant support  Standing - Dynamic : Constant support; Fair  Ambulation/Gait Training:  Distance (ft): 125 Feet (ft)  Assistive Device: Gait belt;Walker, rolling;Orthotic device  Ambulation - Level of Assistance: Contact guard assistance Gait Abnormalities: Step to gait; Antalgic;Decreased step clearance (minor hip hike (L))  Right Side Weight Bearing: Full  Left Side Weight Bearing: Partial (%)  Base of Support: Narrowed  Stance: Left decreased  Speed/Myriam: Slow  Step Length: Left shortened;Right shortened  Swing Pattern: Left asymmetrical       Pain Ratin-2/10      Activity Tolerance:   Good    After treatment patient left in no apparent distress:   Sitting in chair and Call bell within reach    COMMUNICATION/COLLABORATION:   The patients plan of care was discussed with: Registered nurse.      Laurie Miramontes,PTA   Time Calculation: 33 mins

## 2022-07-26 NOTE — PROGRESS NOTES
Bedside and Verbal shift change report given to [place] (oncoming nurse) by Oriana Weston (offgoing nurse). Report included the following information SBAR and Kardex.

## 2022-07-26 NOTE — PROGRESS NOTES
Problem: Self Care Deficits Care Plan (Adult)  Goal: *Acute Goals and Plan of Care (Insert Text)  Description: FUNCTIONAL STATUS PRIOR TO ADMISSION: Patient was modified independent using a walking stick in the home. Uses a wheelchair and/or power wheelchair  in the community. Pt was independent in ADLs. Pt with MS at baseline with affected L sided weakness. Has AFO for L foot drop. HOME SUPPORT: The patient lived with  but did not require assist.    Occupational Therapy Goals  Initiated 7/23/2022  1. Patient will perform bathing with minimal assistance/contact guard assist within 7 day(s). 2.  Patient will perform grooming at sink with supervision/set-up within 7 day(s). 3.  Patient will perform toilet transfers with supervision/set-up within 7 day(s). 4.  Patient will perform all aspects of toileting with supervision/set-up within 7 day(s). Outcome: Progressing Towards Goal     OCCUPATIONAL THERAPY TREATMENT  Patient: Kortney Beltran (13 y.o. female)  Date: 7/26/2022  Diagnosis: Closed fracture of neck of left femur (HCC) [S72.002A]  Acute hip pain, left [M25.552]  Fall from ground level [W18.30XA]  Multiple sclerosis ([facility] [G35] <principal problem not specified>  Procedure(s) (LRB):  LEFT PERCUTANEOUS HIP PINNING (Left) 4 Days Post-Op  Precautions: Fall, PWB (50% WB LLE)  Chart, occupational therapy assessment, plan of care, and goals were reviewed. ASSESSMENT  Patient continues with skilled OT services and is progressing towards goals. AAOx4, overall decent standing balance and carryover for transitional movement and safety precautions. She continues to be limited by decreased standing balance, LLE and LUE weakness and dexterity issues and decreased endurance. Min A to mod A needed for bathing and dressing and showering today. Continue to recommend IPR to assist with progression back to independent level prior to dc home, especially given comorbidity to include MS.  Acute care OT will continue to follow. Current Level of Function Impacting Discharge (ADLs): mod to min A    Other factors to consider for discharge: high plof, MS          PLAN :  Patient continues to benefit from skilled intervention to address the above impairments. Continue treatment per established plan of care to address goals. Recommend with staff: Sarthak Mccann for meals, shower and toilet as able    Recommend next OT session: dressing tasks     Recommendation for discharge: (in order for the patient to meet his/her long term goals)  Therapy up to 5 days/week in SNF setting    This discharge recommendation:  Has been made in collaboration with the attending provider and/or case management    IF patient discharges home will need the following DME: none and TBD following rehab       SUBJECTIVE:   Patient stated This feels so good.  (washing hair)    OBJECTIVE DATA SUMMARY:   Cognitive/Behavioral Status:  Neurologic State: Alert  Orientation Level: Oriented X4  Cognition: Appropriate decision making; Appropriate for age attention/concentration; Appropriate safety awareness  Perception: Appears intact  Perseveration: No perseveration noted  Safety/Judgement: Awareness of environment    Functional Mobility and Transfers for ADLs:  Bed Mobility:  Supine to Sit:  (received OOB in chair)  Sit to Supine:  (returned to chair)    Transfers:  Sit to Stand: Stand-by assistance  Functional Transfers  Shower Transfer: Minimum assistance  Adaptive Equipment: Walker (comment);Grab bars; Tub transfer bench       Balance:  Sitting: Intact  Standing: Impaired  Standing - Static: Good  Standing - Dynamic : Constant support;Good    ADL Intervention:       Grooming  Grooming Assistance: Contact guard assistance;Minimum assistance  Washing Face: Set-up  Brushing Teeth: Set-up  Brushing/Combing Hair: Minimum assistance    Upper Body Bathing  Bathing Assistance: Set-up  Position Performed:  (seated in shower)         Lower Body Bathing  Bathing Assistance: Minimum assistance;Contact guard assistance  Perineal  : Contact guard assistance  Position Performed:  (standing in shower)  Adaptive Equipment: Grab bar (transfer bench)    Upper Body Dressing Assistance  Dressing Assistance: Minimum assistance    Lower Body [address]: Moderate assistance  Underpants: Moderate assistance  Socks: Total assistance (dependent)  Shoes with Cloth Laces:  Total assistance (dependent)  Position Performed: Seated in chair    Toileting  Toileting Assistance: Minimum assistance  Clothing Management: Minimum assistance  Adaptive Equipment: Walker;Grab bars    Cognitive Retraining  Safety/Judgement: Awareness of environment        Pain:  None reported, mild at end of shower and bathing session    Activity Tolerance:   Good    After treatment patient left in no apparent distress:   Sitting in chair, Call bell within reach, and on the phone with family    COMMUNICATION/COLLABORATION:   The patients plan of care was discussed with: Physical therapist.     David Neighbours  Time Calculation: 68 mins

## 2022-07-26 NOTE — DISCHARGE SUMMARY
Discharge Summary       PATIENT ID: Kotrney Beltran  MRN: [id number]   YOB: 1965    DATE OF ADMISSION: 7/22/2022  2:06 AM    DATE OF DISCHARGE: 7/26/2022  PRIMARY CARE PROVIDER: Dina Kayser, MD     ATTENDING PHYSICIAN: Mara Collazo MD  DISCHARGING PROVIDER: Odilon Mays NP    To contact this individual call [phone] and ask the  to page. If unavailable ask to be transferred the Adult Hospitalist Department. CONSULTATIONS: IP CONSULT TO HOSPITALIST  IP CONSULT TO ORTHOPEDIC SURGERY    PROCEDURES/SURGERIES: Procedure(s):  LEFT PERCUTANEOUS HIP PINNING    ADMITTING [facility] COURSE:   Per H&P, Kortney Beltran is a 61 y.o. female with past medical history of multiple sclerosis presented to the ED with chief complaint of left hip pain after fall. Patient reportedly had a ground level fall, after tripping and landing on hard tile floor in her bedroom at about 10 p.m. tonight. She complains of left hip pain, sudden after fall, moderate to severe, aggravated with movement, weight bearing, or touch. She denies any head/ neck trauma or loss of consciousness. She notes having chronic left foot drop with left sided weakness secondary to MS. She notes that she takes Baclofen for the same. On arrival in the ED, workup included left femur xray showing acute left femoral neck fractures. ED then requested admission to the hospitalist service.   Per report from ED MD, orthopedic surgery service are aware of consultation        DISCHARGE DIAGNOSES / PLAN:      Left femoral neck fracture  S/p ground-level fall  Orthopedics following, appreciate recommendations  Postop day 4 percutaneous pin  Continuing multimodal pain control  Working with physical therapy recommend IPR     MS  I reviewed medications with the patient  Continue baclofen            Code status: DNR  Prophylaxis: ASA       PENDING TEST RESULTS:   At the time of discharge the following test results are still pending:     FOLLOW UP APPOINTMENTS:    Follow-up Information       Follow up With Specialties Details Why Adam Victoria Call in 1 day(s) Please call  [address]    Corinne Apgar, MD Family Medicine   [address] [place] [address]      Clara Roy MD Orthopedic Surgery Follow up in 2 week(s)  [address]                ADDITIONAL CARE RECOMMENDATIONS:     DIET: Regular Diet      ACTIVITY: Activity as tolerated    WOUND CARE: na    EQUIPMENT needed: TBD      DISCHARGE MEDICATIONS:  Current Discharge Medication List        START taking these medications    Details   aspirin delayed-release 81 mg tablet Take 1 Tablet by mouth two (2) times a day for 30 days. Qty: 60 Tablet, Refills: 0  Start date: 2022, End date: 2022      famotidine (PEPCID) 20 mg tablet Take 1 Tablet by mouth two (2) times a day for 30 days. Qty: 60 Tablet, Refills: 0  Start date: 2022, End date: 2022      traMADoL (ULTRAM) 50 mg tablet Take 1 Tablet by mouth every six (6) hours as needed for Pain for up to 3 days. Max Daily Amount: 200 mg. Qty: 10 Tablet, Refills: 0  Start date: 2022, End date: 2022    Associated Diagnoses: Closed fracture of neck of left femur, initial encounter ([facility]           CONTINUE these medications which have NOT CHANGED    Details   baclofen (LIORESAL) 10 mg tablet Take 10 mg by mouth four (4) times daily. FLUoxetine (PROzac) 10 mg capsule Take 10 mg by mouth in the morning.  Indications: anxiousness associated with depression           STOP taking these medications       nirmatrelvir-ritonavir (PAXLOVID) 150 mg x 2- 100 mg tablet Comments:   Reason for Stopping:         fluocinoNIDE (LIDEX) 0.05 % topical cream Comments:   Reason for Stopping:         glatiramer (COPAXONE) 20 mg/mL injection Comments:   Reason for Stopping:                 NOTIFY YOUR PHYSICIAN FOR ANY OF THE FOLLOWING:   Fever over 101 degrees for 24 hours. Chest pain, shortness of breath, fever, chills, nausea, vomiting, diarrhea, change in mentation, falling, weakness, bleeding. Severe pain or pain not relieved by medications. Or, any other signs or symptoms that you may have questions about.     DISPOSITION:    Home With:   OT  PT  HH  RN      x Inpatient Rehab    Independent/assisted living    Hospice    Other:       PATIENT CONDITION AT DISCHARGE:     Functional status    Poor    x Deconditioned     Independent      Cognition    x Lucid     Forgetful     Dementia      Catheters/lines (plus indication)    Little     PICC     PEG    x None      Code status     Full code    x DNR      PHYSICAL EXAMINATION AT DISCHARGE:   Refer to Progress Note      CHRONIC MEDICAL DIAGNOSES:  Problem List as of 7/26/2022 Date Reviewed: 6/2/2017            Codes Class Noted - Resolved    Multiple sclerosis ([facility] ICD-10-CM: Derian Estimable  ICD-9-CM: 637  7/22/2022 - Present        Closed fracture of neck of left femur ([facility] ICD-10-CM: F61.603V  ICD-9-CM: 820.8  7/22/2022 - Present        Acute hip pain, left ICD-10-CM: M25.552  ICD-9-CM: 719.45  7/22/2022 - Present        Fall from ground level ICD-10-CM: S98.26SR  ICD-9-CM: E888.9  7/22/2022 - Present        MS (multiple sclerosis) ([facility] ICD-10-CM: G35  ICD-9-CM: 340  Unknown - Present           Greater than 30 minutes were spent with the patient on counseling and coordination of care    Signed:   Letty Victoria NP  7/26/2022  11:32 AM

## 2022-07-26 NOTE — PROGRESS NOTES
Called [facility] and gave report to the nurse receiving patinet at [facility]. Report included SBAR and MAR.

## 2022-07-26 NOTE — PROGRESS NOTES
Problem: Falls - Risk of  Goal: *Absence of Falls  Description: Document Timi Lobe Fall Risk and appropriate interventions in the flowsheet.   Outcome: Progressing Towards Goal  Note: Fall Risk Interventions:  Mobility Interventions: Bed/chair exit alarm         Medication Interventions: Patient to call before getting OOB    Elimination Interventions: Call light in reach    History of Falls Interventions: Bed/chair exit alarm

## 2022-07-26 NOTE — PROGRESS NOTES
Transition of Care: Auth approved for [facility] IPR and they can accept patient today. Patient prefers to transport to [facility] in the car with family. CM confirmed with therapy patient should be safe to transport via car. The son will be here around 2 PM to transport patient. Discharge folder located on hard chart to include discharge papers, Kardex, MAR. CM completed CM portion of Emtala. RN to follow with printed Emtala and call report to #[phone]    CM received call from Wolfgang De La Cruz with [facility] #[phone] who notified Baudilio Cates is approved. CM notified attending, plan for discharge today.      Eliza Jean, BSW/CRM

## 2022-08-19 ENCOUNTER — OFFICE VISIT (OUTPATIENT)
Dept: ORTHOPEDIC SURGERY | Age: 57
End: 2022-08-19
Payer: COMMERCIAL

## 2022-08-19 VITALS — HEIGHT: 67 IN | BODY MASS INDEX: 19.62 KG/M2 | WEIGHT: 125 LBS

## 2022-08-19 DIAGNOSIS — Z98.890 STATUS POST HIP SURGERY: Primary | ICD-10-CM

## 2022-08-19 PROCEDURE — 99024 POSTOP FOLLOW-UP VISIT: CPT | Performed by: ORTHOPAEDIC SURGERY

## 2022-08-19 NOTE — PROGRESS NOTES
Jeanna Cavazos (: 1965) is a 61 y.o. female patient, here for evaluation of the following chief complaint(s):  Surgical Follow-up (Left hip follow up/)       ASSESSMENT/PLAN:  Below is the assessment and plan developed based on review of pertinent history, physical exam, labs, studies, and medications. 18-year-old female comes in today for follow-up. She is status post right hip percutaneous pinning on 2022. Postoperatively doing fair. She has been toe-touch weightbearing to 50% weightbearing. Discussed with patient she is cleared to increase to 50% weight-bear as tolerated for the next 2 to 3 weeks. At which point she can progress to full weightbearing status as tolerated. Prescription given for outpatient physical therapy. Plans to follow-up in 6 weeks for standard postop check or sooner as needed. 1. Status post hip surgery  -     XR HIP LT W OR WO PELV 2-3 VWS; Future  -     REFERRAL TO PHYSICAL THERAPY      Encounter Diagnosis   Name Primary? Status post hip surgery Yes        No follow-ups on file. SUBJECTIVE/OBJECTIVE:  Jeanna Cavazos (: 1965) is a 61 y.o. female who presents today for the following:  Chief Complaint   Patient presents with    Surgical Follow-up     Left hip follow up         18-year-old female comes in today for follow-up. She status post right hip percutaneous pinning on 2022. Still has some mild discomfort. Ambulating with a walker with toe-touch to 50% weightbearing. Incision healing well. Difficulty sleeping at night still but slowly improving. IMAGING:  XR Results (most recent):  Results from Appointment encounter on 22    XR HIP LT W OR WO PELV 2-3 VWS    Narrative  3 views left hip x-rays ordered and personally reviewed. Patient status post percutaneous pinning of the left hip. Plans well-placed. No failure cut out noted. Fracture appears well aligned.        Allergies   Allergen Reactions    Sulfa (Sulfonamide Antibiotics) Unknown (comments)       Current Outpatient Medications   Medication Sig    aspirin delayed-release 81 mg tablet Take 1 Tablet by mouth two (2) times a day for 30 days. famotidine (PEPCID) 20 mg tablet Take 1 Tablet by mouth two (2) times a day for 30 days. baclofen (LIORESAL) 10 mg tablet Take 10 mg by mouth four (4) times daily. FLUoxetine (PROzac) 10 mg capsule Take 10 mg by mouth in the morning. Indications: anxiousness associated with depression     No current facility-administered medications for this visit. Past Medical History:   Diagnosis Date    MS (multiple sclerosis) ([facility]         No past surgical history on file. Family History   Problem Relation Age of Onset    Cancer Mother         colon-age 37    Thyroid Disease Mother     Hypertension Father     Thyroid Disease Sister     OSTEOARTHRITIS Brother         Social History     Tobacco Use    Smoking status: Never    Smokeless tobacco: Not on file   Substance Use Topics    Alcohol use: Yes     Alcohol/week: 3.3 standard drinks     Types: 4 Glasses of wine per week        All systems reviewed x 12 and were negative with the exception of None      No flowsheet data found. Vitals:  Ht 5' 7\" (1.702 m)   Wt 125 lb (56.7 kg)   BMI 19.58 kg/m²    Body mass index is 19.58 kg/m². Physical Exam    General: NAD    Cardiac: Extremities well perfused. Respiratory: Nonlabored breathing. RLE: Incisions clean dry and intact with no erythema. Minimal numbness over the LFCN. Walking with a walker. Mild pain with gentle internal and external rotation. .  Motor strength is grossly intact. Skin warm well perfused. Capillary refill <2 sec. Krysta Weber M.D. was available for immediate consultation as the supervising physician. An electronic signature was used to authenticate this note.   -- Sreekanth Xiao PA-C

## 2022-08-19 NOTE — LETTER
NOTIFICATION RETURN TO WORK / SCHOOL    8/19/2022 9:51 AM    Ms. Kortney Beltran  [place] [id number]      To Whom It May Concern:    Kortney Beltran is currently under the care of [facility]. No Abx needed for dental work at this time give procedure done. If there are questions or concerns please have the patient contact our office.         Sincerely,      Luca Gu PA-C

## 2022-08-23 PROBLEM — M80.00XA AGE-RELATED OSTEOPOROSIS WITH CURRENT PATHOLOGICAL FRACTURE: Status: ACTIVE | Noted: 2022-08-23

## 2022-10-07 ENCOUNTER — OFFICE VISIT (OUTPATIENT)
Dept: ORTHOPEDIC SURGERY | Age: 57
End: 2022-10-07
Payer: COMMERCIAL

## 2022-10-07 VITALS — WEIGHT: 121 LBS | BODY MASS INDEX: 18.99 KG/M2 | HEIGHT: 67 IN

## 2022-10-07 DIAGNOSIS — Z98.890 STATUS POST HIP SURGERY: Primary | ICD-10-CM

## 2022-10-07 PROCEDURE — 99024 POSTOP FOLLOW-UP VISIT: CPT | Performed by: ORTHOPAEDIC SURGERY

## 2022-10-07 NOTE — PROGRESS NOTES
Adrian Gore (: 1965) is a 61 y.o. female patient, here for evaluation of the following chief complaint(s):  Surgical Follow-up (Left hip follow up/)       ASSESSMENT/PLAN:  Below is the assessment and plan developed based on review of pertinent history, physical exam, labs, studies, and medications. 21-year-old female who is status post percutaneous pinning left hip. She had a displaced femoral neck fracture however after discussion with the family due to her MS we elected to attempt percutaneous pinning. She is doing relatively well. She says she has mild to moderate pain. She is using a walker currently. Her x-rays reveal mild interval healing. At this point I would let her weight-bear as tolerated. I would like to see her back in 6 weeks      1. Status post hip surgery  -     XR HIP LT W OR WO PELV 2-3 VWS; Future      Encounter Diagnosis   Name Primary? Status post hip surgery Yes        No follow-ups on file. SUBJECTIVE/OBJECTIVE:  Adrian Gore (: 1965) is a 61 y.o. female who presents today for the following:  Chief Complaint   Patient presents with    Surgical Follow-up     Left hip follow up         21-year-old female who is status post percutaneous pinning left hip. She had a displaced femoral neck fracture however after discussion with the family due to her MS we elected to attempt percutaneous pinning. She is doing relatively well. She says she has mild to moderate pain. IMAGING:  XR Results (most recent):  Results from Appointment encounter on 10/07/22    XR HIP LT W OR WO PELV 2-3 VWS    Narrative  AP and lateral x-rays ordered and independently reviewed left hip. Status post percutaneous pinning for displaced femoral neck fracture.   Alignment appropriate       Allergies   Allergen Reactions    Sulfa (Sulfonamide Antibiotics) Unknown (comments)       Current Outpatient Medications   Medication Sig    baclofen (LIORESAL) 10 mg tablet Take 10 mg by mouth four (4) times daily. FLUoxetine (PROzac) 10 mg capsule Take 10 mg by mouth in the morning. Indications: anxiousness associated with depression     No current facility-administered medications for this visit. Past Medical History:   Diagnosis Date    MS (multiple sclerosis) ([facility]         Past Surgical History:   Procedure Laterality Date    HX ORTHOPAEDIC Left     hip pinning after fx - 8/22       Family History   Problem Relation Age of Onset    Cancer Mother         colon-age 37    Thyroid Disease Mother     Hypertension Father     Thyroid Disease Sister     OSTEOARTHRITIS Brother         Social History     Tobacco Use    Smoking status: Never    Smokeless tobacco: Not on file   Substance Use Topics    Alcohol use: Yes     Alcohol/week: 3.3 standard drinks     Types: 4 Glasses of wine per week        All systems reviewed x 12 and were negative with the exception of None      No flowsheet data found. Vitals:  Ht 5' 7\" (1.702 m)   Wt 121 lb (54.9 kg)   BMI 18.95 kg/m²    Body mass index is 18.95 kg/m². Physical Exam    General: NAD    Cardiac: Extremities well perfused. Respiratory: Nonlabored breathing. LLE: Incision clean dry and intact with no erythema. Negative stinchfield. No pain with gentle internal and external rotation. .  Motor strength is grossly intact. Skin warm well perfused. Capillary refill <2 sec. An electronic signature was used to authenticate this note.   -- Saad Hairston MD

## 2022-11-18 ENCOUNTER — OFFICE VISIT (OUTPATIENT)
Dept: ORTHOPEDIC SURGERY | Age: 57
End: 2022-11-18
Payer: COMMERCIAL

## 2022-11-18 VITALS — HEIGHT: 67 IN | BODY MASS INDEX: 18.99 KG/M2 | WEIGHT: 121 LBS

## 2022-11-18 DIAGNOSIS — Z98.890 STATUS POST HIP SURGERY: Primary | ICD-10-CM

## 2022-11-18 PROCEDURE — 99024 POSTOP FOLLOW-UP VISIT: CPT | Performed by: ORTHOPAEDIC SURGERY

## 2022-11-18 NOTE — PROGRESS NOTES
Bakari Ornelas (: 1965) is a 61 y.o. female patient, here for evaluation of the following chief complaint(s):  Hip Pain       ASSESSMENT/PLAN:  Below is the assessment and plan developed based on review of pertinent history, physical exam, labs, studies, and medications. Status post percutaneous pinning. She is more than 2 months postop. She says she is doing well and has minimal pain. She is walking with a walker per baseline because of her MS. No other issues. Follow-up 6 months for repeat x-ray      1. Status post hip surgery  -     XR HIP LT W OR WO PELV 2-3 VWS; Future      Encounter Diagnosis   Name Primary? Status post hip surgery Yes        No follow-ups on file. SUBJECTIVE/OBJECTIVE:  Bakari Ornelas (: 1965) is a 61 y.o. female who presents today for the following:  Chief Complaint   Patient presents with    Hip Pain       Status post percutaneous pinning. She is more than 2 months postop. She says she is doing well and has minimal pain. IMAGING:  XR Results (most recent):  Results from Appointment encounter on 22    XR HIP LT W OR WO PELV 2-3 VWS    Narrative  AP and lateral x-rays ordered and intimately reviewed left hip. Status post percutaneous pinning. Interval healing. No further shortening. Allergies   Allergen Reactions    Sulfa (Sulfonamide Antibiotics) Unknown (comments)       Current Outpatient Medications   Medication Sig    baclofen (LIORESAL) 10 mg tablet Take 10 mg by mouth four (4) times daily. FLUoxetine (PROzac) 10 mg capsule Take 10 mg by mouth in the morning. Indications: anxiousness associated with depression     No current facility-administered medications for this visit.        Past Medical History:   Diagnosis Date    MS (multiple sclerosis) ([facility]         Past Surgical History:   Procedure Laterality Date    HX ORTHOPAEDIC Left     hip pinning after fx -        Family History   Problem Relation Age of Onset Cancer Mother         colon-age 37    Thyroid Disease Mother     Hypertension Father     Thyroid Disease Sister     OSTEOARTHRITIS Brother         Social History     Tobacco Use    Smoking status: Never    Smokeless tobacco: Not on file   Substance Use Topics    Alcohol use: Yes     Alcohol/week: 3.3 standard drinks     Types: 4 Glasses of wine per week        All systems reviewed x 12 and were negative with the exception of None      No flowsheet data found. Vitals:  Ht 5' 7\" (1.702 m)   Wt 121 lb (54.9 kg)   BMI 18.95 kg/m²    Body mass index is 18.95 kg/m². Physical Exam    General: NAD    Cardiac: Extremities well perfused. Respiratory: Nonlabored breathing. LLE: Incision clean dry and intact with no erythema. Negative stinchfield. No pain with gentle internal and external rotation. .  Motor strength is grossly intact. Skin warm well perfused. Capillary refill <2 sec. An electronic signature was used to authenticate this note.   -- Marie Leslie MD

## 2024-03-22 PROBLEM — M81.0 OSTEOPOROSIS: Status: ACTIVE | Noted: 2024-03-22

## 2024-03-29 ENCOUNTER — HOSPITAL ENCOUNTER (OUTPATIENT)
Facility: HOSPITAL | Age: 59
Setting detail: INFUSION SERIES
Discharge: HOME | End: 2024-03-29
Payer: COMMERCIAL

## 2024-03-29 VITALS
BODY MASS INDEX: 19.21 KG/M2 | WEIGHT: 122.4 LBS | HEART RATE: 66 BPM | DIASTOLIC BLOOD PRESSURE: 75 MMHG | RESPIRATION RATE: 16 BRPM | TEMPERATURE: 98 F | HEIGHT: 67 IN | SYSTOLIC BLOOD PRESSURE: 120 MMHG | OXYGEN SATURATION: 99 %

## 2024-03-29 DIAGNOSIS — M81.0 OSTEOPOROSIS, UNSPECIFIED OSTEOPOROSIS TYPE, UNSPECIFIED PATHOLOGICAL FRACTURE PRESENCE: Primary | ICD-10-CM

## 2024-03-29 LAB
ALBUMIN SERPL-MCNC: 4.2 G/DL (ref 3.5–5)
ALBUMIN/GLOB SERPL: 1.1 (ref 1.1–2.2)
ALP SERPL-CCNC: 61 U/L (ref 45–117)
ALT SERPL-CCNC: 23 U/L (ref 12–78)
ANION GAP BLD CALC-SCNC: 9.1 MMOL/L (ref 10–20)
ANION GAP SERPL CALC-SCNC: 4 MMOL/L (ref 5–15)
AST SERPL-CCNC: 45 U/L (ref 15–37)
BILIRUB SERPL-MCNC: 0.5 MG/DL (ref 0.2–1)
BUN SERPL-MCNC: 17 MG/DL (ref 6–20)
BUN/CREAT SERPL: 20 (ref 12–20)
CA-I BLD-MCNC: 1.09 MMOL/L (ref 1.12–1.32)
CALCIUM SERPL-MCNC: 9.6 MG/DL (ref 8.5–10.1)
CHLORIDE BLD-SCNC: 103 MMOL/L (ref 98–107)
CHLORIDE SERPL-SCNC: 106 MMOL/L (ref 97–108)
CO2 BLD-SCNC: 28.9 MMOL/L (ref 21–32)
CO2 SERPL-SCNC: 28 MMOL/L (ref 21–32)
CREAT BLD-MCNC: 0.66 MG/DL (ref 0.6–1.3)
CREAT SERPL-MCNC: 0.86 MG/DL (ref 0.55–1.02)
GLOBULIN SER CALC-MCNC: 3.8 G/DL (ref 2–4)
GLUCOSE BLD-MCNC: 147 MG/DL (ref 65–100)
GLUCOSE SERPL-MCNC: 144 MG/DL (ref 65–100)
POTASSIUM BLD-SCNC: 5.7 MMOL/L (ref 3.5–5.1)
POTASSIUM SERPL-SCNC: 5.6 MMOL/L (ref 3.5–5.1)
PROT SERPL-MCNC: 8 G/DL (ref 6.4–8.2)
SERVICE CMNT-IMP: ABNORMAL
SODIUM BLD-SCNC: 141 MMOL/L (ref 136–145)
SODIUM SERPL-SCNC: 138 MMOL/L (ref 136–145)

## 2024-03-29 PROCEDURE — 6360000002 HC RX W HCPCS: Performed by: STUDENT IN AN ORGANIZED HEALTH CARE EDUCATION/TRAINING PROGRAM

## 2024-03-29 PROCEDURE — 80053 COMPREHEN METABOLIC PANEL: CPT

## 2024-03-29 PROCEDURE — 80047 BASIC METABLC PNL IONIZED CA: CPT

## 2024-03-29 PROCEDURE — 96374 THER/PROPH/DIAG INJ IV PUSH: CPT

## 2024-03-29 RX ORDER — SODIUM CHLORIDE 9 MG/ML
5-250 INJECTION, SOLUTION INTRAVENOUS PRN
Status: DISCONTINUED | OUTPATIENT
Start: 2024-03-29 | End: 2024-03-30 | Stop reason: HOSPADM

## 2024-03-29 RX ORDER — SODIUM CHLORIDE 9 MG/ML
5-250 INJECTION, SOLUTION INTRAVENOUS PRN
OUTPATIENT
Start: 2025-03-23

## 2024-03-29 RX ORDER — ZOLEDRONIC ACID 5 MG/100ML
5 INJECTION, SOLUTION INTRAVENOUS ONCE
Status: COMPLETED | OUTPATIENT
Start: 2024-03-29 | End: 2024-03-29

## 2024-03-29 RX ORDER — ZOLEDRONIC ACID 5 MG/100ML
5 INJECTION, SOLUTION INTRAVENOUS ONCE
OUTPATIENT
Start: 2025-03-23 | End: 2025-03-23

## 2024-03-29 RX ADMIN — ZOLEDRONIC ACID 5 MG: 0.05 INJECTION, SOLUTION INTRAVENOUS at 15:54

## 2024-03-29 NOTE — PROGRESS NOTES
[facility] Progress Note        Date: 24    Name: Virginia Mendoza    MRN: [id number]         : 1965      Ms. Mendoza [facility] for [facility] Reclast ambulatory with walker in stable condition. Assessment completed. No new concerns voiced.  24 gauge peripheral IV obtained in the RAC without difficulty, line flushed and capped Labs drawn and sent for processing.    This is patient's first time receiving this medication. All questions answered and patient verbalized understanding of all information given.      Vitals:    24 1410 24 1619   BP: (!) 117/59 120/75   Pulse: 76 66   Resp: 16 16   Temp: 97.8 °F (36.6 °C) 98 °F (36.7 °C)   TempSrc: Temporal Temporal   SpO2: 99% 99%   Weight: 55.5 kg (122 lb 6.4 oz)    Height: 1.702 m (5' 7\")            Results for orders placed or performed during the hospital encounter of 24   Comprehensive Metabolic Panel   Result Value Ref Range    Sodium 138 136 - 145 mmol/L    Potassium 5.6 (H) 3.5 - 5.1 mmol/L    Chloride 106 97 - 108 mmol/L    CO2 28 21 - 32 mmol/L    Anion Gap 4 (L) 5 - 15 mmol/L    Glucose 144 (H) 65 - 100 mg/dL    BUN 17 6 - 20 MG/DL    Creatinine 0.86 0.55 - 1.02 MG/DL    Bun/Cre Ratio 20 12 - 20      Est, Glom Filt Rate 78 >60 ml/min/1.73m2    Calcium 9.6 8.5 - 10.1 MG/DL    Total Bilirubin 0.5 0.2 - 1.0 MG/DL    ALT 23 12 - 78 U/L    AST 45 (H) 15 - 37 U/L    Alk Phosphatase 61 45 - 117 U/L    Total Protein 8.0 6.4 - 8.2 g/dL    Albumin 4.2 3.5 - 5.0 g/dL    Globulin 3.8 2.0 - 4.0 g/dL    Albumin/Globulin Ratio 1.1 1.1 - 2.2     POC CHEM 8   Result Value Ref Range    POC Ionized Calcium 1.09 (L) 1.12 - 1.32 mmol/L    POC Sodium 141 136 - 145 mmol/L    POC Potassium 5.7 (H) 3.5 - 5.1 mmol/L    POC Chloride 103 98 - 107 mmol/L    POC TCO2 28.9 21 - 32 mmol/L    Anion Gap, POC 9.1 (L) 10 - 20 mmol/L    POC Glucose 147 (H) 65 - 100 mg/dL    POC Creatinine 0.66 0.6 - 1.3 mg/dL    eGFR, POC >90 >60  ml/min/1.73m2    UA Comment Comment Not Indicated.               Medications:  MEDICATIONS GIVEN:  Medications Administered         zoledronic acid (RECLAST) 5 mg/100 mL infusion Admin Date  03/29/2024 Action  New Bag Dose  5 mg Rate  400 mL/hr Route  IntraVENous Administered By  Sara Conklin RN              Post-Infusion Vitals:  Vitals:    03/29/24 1619   BP: 120/75   Pulse: 66   Resp: 16   Temp: 98 °F (36.7 °C)   SpO2: 99%         Pt tolerated treatment well. PIV maintained positive blood return throughout treatment, flushed with positive blood return at conclusion and removed and wrapped in coban per protocol. D/c home ambulatory with walker in no distress. Patient is to follow up with the physician for their next appointment.        Future Appointments:  Future Appointments   Date Time Provider Department Center   4/3/2025  2:00 PM [facility]

## 2025-05-20 ENCOUNTER — HOSPITAL ENCOUNTER (OUTPATIENT)
Facility: HOSPITAL | Age: 60
Setting detail: INFUSION SERIES
Discharge: HOME | End: 2025-05-20
Payer: COMMERCIAL

## 2025-05-20 VITALS
HEART RATE: 63 BPM | WEIGHT: 122.6 LBS | HEIGHT: 67 IN | BODY MASS INDEX: 19.24 KG/M2 | DIASTOLIC BLOOD PRESSURE: 76 MMHG | SYSTOLIC BLOOD PRESSURE: 122 MMHG | TEMPERATURE: 97.5 F | RESPIRATION RATE: 16 BRPM

## 2025-05-20 DIAGNOSIS — M81.0 OSTEOPOROSIS, UNSPECIFIED OSTEOPOROSIS TYPE, UNSPECIFIED PATHOLOGICAL FRACTURE PRESENCE: Primary | ICD-10-CM

## 2025-05-20 LAB
ANION GAP BLD CALC-SCNC: 8.5 MMOL/L (ref 10–20)
CA-I BLD-MCNC: 1.28 MMOL/L (ref 1.15–1.33)
CHLORIDE BLD-SCNC: 105 MMOL/L (ref 98–107)
CO2 BLD-SCNC: 34.5 MMOL/L (ref 21–32)
CREAT BLD-MCNC: 0.64 MG/DL (ref 0.6–1.3)
GLUCOSE BLD-MCNC: 80 MG/DL (ref 74–99)
POTASSIUM BLD-SCNC: 3.8 MMOL/L (ref 3.5–5.1)
SERVICE CMNT-IMP: ABNORMAL
SODIUM BLD-SCNC: 148 MMOL/L (ref 136–145)

## 2025-05-20 PROCEDURE — 6360000002 HC RX W HCPCS: Performed by: STUDENT IN AN ORGANIZED HEALTH CARE EDUCATION/TRAINING PROGRAM

## 2025-05-20 PROCEDURE — 80047 BASIC METABLC PNL IONIZED CA: CPT

## 2025-05-20 PROCEDURE — 96374 THER/PROPH/DIAG INJ IV PUSH: CPT

## 2025-05-20 RX ORDER — SODIUM CHLORIDE 9 MG/ML
5-250 INJECTION, SOLUTION INTRAVENOUS PRN
Status: DISCONTINUED | OUTPATIENT
Start: 2025-05-20 | End: 2025-05-21 | Stop reason: HOSPADM

## 2025-05-20 RX ORDER — SODIUM CHLORIDE 9 MG/ML
5-250 INJECTION, SOLUTION INTRAVENOUS PRN
OUTPATIENT
Start: 2026-03-22

## 2025-05-20 RX ORDER — ZOLEDRONIC ACID 0.05 MG/ML
5 INJECTION, SOLUTION INTRAVENOUS ONCE
Status: COMPLETED | OUTPATIENT
Start: 2025-05-20 | End: 2025-05-20

## 2025-05-20 RX ORDER — ZOLEDRONIC ACID 0.05 MG/ML
5 INJECTION, SOLUTION INTRAVENOUS ONCE
Status: CANCELLED | OUTPATIENT
Start: 2026-03-22 | End: 2026-03-22

## 2025-05-20 RX ADMIN — ZOLEDRONIC ACID 5 MG: 5 INJECTION INTRAVENOUS at 09:23

## 2025-05-20 NOTE — PROGRESS NOTES
[facility] Short Note                   Date: May 20, 2025    Name: Virginia Mendoza    MRN: [id number]         : 1965      Pt admit to [facility] for Reclast ambulatory in stable condition. Assessment completed and documented in flowsheets. No acute concerns at this time.  Please review pending lab results in CC.      Ms. Mendoza's vitals were reviewed prior to and after treatment.   Patient Vitals for the past 12 hrs:   Temp Pulse Resp BP   25 0845 97.5 °F (36.4 °C) 63 16 122/76         Lab results were obtained and reviewed. Labs within parameter for treatment.  Recent Results (from the past 12 hours)   POC CHEM 8    Collection Time: 25  8:59 AM   Result Value Ref Range    POC Ionized Calcium 1.28 1.15 - 1.33 mmol/L    POC Sodium 148 (H) 136 - 145 mmol/L    POC Potassium 3.8 3.5 - 5.1 mmol/L    POC Chloride 105 98 - 107 mmol/L    POC TCO2 34.5 (H) 21 - 32 mmol/L    Anion Gap, POC 8.5 (L) 10 - 20 mmol/L    POC Glucose 80 74 - 99 mg/dL    POC Creatinine 0.64 0.6 - 1.3 mg/dL    eGFR, POC >90 >60 ml/min/1.73m2    UA Comment Comment Not Indicated.             Medications given: via PIV R AC  Medications Administered         zoledronic acid (RECLAST) 5 mg/100 mL infusion Admin Date  2025 Action  New Bag Dose  5 mg Rate  400 mL/hr Route  IntraVENous Documented By  Kellie Miles RN            PIV placed with positive blood return. PIV was flushed and removed per protocol prior to discharge.    Ms. Mendoza tolerated the infusion and had no complaints.    [facility] Mendo[facility] in [facility] condition and is aware of future appointments.     No future appointments.    KELLIE MILES RN  May 20, 2025  10:07 AM

## (undated) DEVICE — SUTURE VCRL 1 L27IN ABSRB CT BRAID COAT UD J281H

## (undated) DEVICE — BLADE ELECTRODE: Brand: EDGE

## (undated) DEVICE — 3M™ STERI-DRAPE™ U-DRAPE 1015: Brand: STERI-DRAPE™

## (undated) DEVICE — SUTURE VCRL SZ 2-0 L36IN ABSRB UD L40MM CT 1/2 CIR J957H

## (undated) DEVICE — SCRUB DRY SURG EZ SCRUB BRUSH PREOPERATIVE GRN

## (undated) DEVICE — PADDING CAST SPEC 6INX4YD COT --

## (undated) DEVICE — DRSG POSTOP PRMSL AG 3.5X14IN

## (undated) DEVICE — DRESSING HYDROCOLLOID BORDER 35X10 IN ALUM PRIMASEAL

## (undated) DEVICE — GOWN,SIRUS,NONRNF,SETINSLV,2XL,18/CS: Brand: MEDLINE

## (undated) DEVICE — T4 HOOD

## (undated) DEVICE — GLOVE SURG SZ 65 L12IN FNGR THK79MIL GRN LTX FREE

## (undated) DEVICE — HYPODERMIC SAFETY NEEDLE: Brand: MAGELLAN

## (undated) DEVICE — GLOVE SURG SZ 65 L12IN FNGR THK94MIL STD WHT LTX FREE

## (undated) DEVICE — 4-PORT MANIFOLD: Brand: NEPTUNE 2

## (undated) DEVICE — SYR LR LCK 1ML GRAD NSAF 30ML --

## (undated) DEVICE — C-ARM: Brand: UNBRANDED

## (undated) DEVICE — SOLUTION IRRIG 1000ML STRL H2O USP PLAS POUR BTL

## (undated) DEVICE — GLOVE SURG SZ 8 L12IN FNGR THK94MIL STD WHT LTX FREE

## (undated) DEVICE — SOLUTION IRRIG 3000ML 0.9% SOD CHL USP UROMATIC PLAS CONT

## (undated) DEVICE — PREP SKN CHLRAPRP APL 26ML STR --

## (undated) DEVICE — SUTURE MCRYL SZ 3-0 L27IN ABSRB UD L24MM PS-1 3/8 CIR PRIM Y936H

## (undated) DEVICE — SCREW BNE L85MM DIA6.5MM THRD L16MM CANC S STL SELF DRL ST
Type: IMPLANTABLE DEVICE | Site: HIP | Status: NON-FUNCTIONAL
Removed: 2022-07-22

## (undated) DEVICE — 6619 2 PTNT ISO SYS INCISE AREA&LT;(&GT;&&LT;)&GT;P: Brand: STERI-DRAPE™ IOBAN™ 2

## (undated) DEVICE — SYR 20ML LL STRL LF --

## (undated) DEVICE — BLADE SAW W0.49XL3.15IN THK0.047IN CUT THK0.047IN REPL SAG

## (undated) DEVICE — COVER,MAYO STAND,STERILE: Brand: MEDLINE

## (undated) DEVICE — SPONGE GZ W4XL4IN COT 12 PLY TYP VII WVN C FLD DSGN

## (undated) DEVICE — DERMABOND SKIN ADH 0.7ML -- DERMABOND ADVANCED 12/BX

## (undated) DEVICE — SOLUTION SURG PREP 26 CC PURPREP

## (undated) DEVICE — Device: Brand: JELCO

## (undated) DEVICE — TOTAL JOINT - SMH: Brand: MEDLINE INDUSTRIES, INC.

## (undated) DEVICE — SUTURE STRATAFIX SYMMETRIC PDS + SZ 1 L18IN ABSRB VLT L48MM SXPP1A400

## (undated) DEVICE — HANDPIECE SET WITH BONE CLEANING TIP AND SUCTION TUBE: Brand: INTERPULSE

## (undated) DEVICE — GUIDEWIRE ORTH L300MM DIA2.8MM S STL THRD SHRP TRCR TIP FOR

## (undated) DEVICE — CONTAINER,SPECIMEN,3OZ,OR STRL: Brand: MEDLINE

## (undated) DEVICE — BLADE SAW W11.2XL77.5MM THK0.76MM CUT THK1.17MM REPL RECIP

## (undated) DEVICE — GLOVE ORTHO 8   MSG9480

## (undated) DEVICE — SUTURE ETHBND EXCEL SZ 2 L30IN NONABSORBABLE GRN L40MM V-37 MX69G